# Patient Record
Sex: MALE | Race: OTHER | NOT HISPANIC OR LATINO
[De-identification: names, ages, dates, MRNs, and addresses within clinical notes are randomized per-mention and may not be internally consistent; named-entity substitution may affect disease eponyms.]

---

## 2018-07-02 ENCOUNTER — APPOINTMENT (OUTPATIENT)
Dept: PULMONOLOGY | Facility: CLINIC | Age: 73
End: 2018-07-02
Payer: MEDICARE

## 2018-07-02 VITALS
TEMPERATURE: 98 F | WEIGHT: 215 LBS | BODY MASS INDEX: 33.74 KG/M2 | OXYGEN SATURATION: 93 % | HEIGHT: 67 IN | DIASTOLIC BLOOD PRESSURE: 87 MMHG | RESPIRATION RATE: 12 BRPM | SYSTOLIC BLOOD PRESSURE: 150 MMHG | HEART RATE: 67 BPM

## 2018-07-02 PROCEDURE — 94727 GAS DIL/WSHOT DETER LNG VOL: CPT

## 2018-07-02 PROCEDURE — 99214 OFFICE O/P EST MOD 30 MIN: CPT | Mod: 25

## 2018-07-02 PROCEDURE — 94729 DIFFUSING CAPACITY: CPT

## 2018-10-15 ENCOUNTER — MEDICATION RENEWAL (OUTPATIENT)
Age: 73
End: 2018-10-15

## 2018-12-31 ENCOUNTER — OTHER (OUTPATIENT)
Age: 73
End: 2018-12-31

## 2019-01-16 ENCOUNTER — RX RENEWAL (OUTPATIENT)
Age: 74
End: 2019-01-16

## 2019-04-09 ENCOUNTER — OTHER (OUTPATIENT)
Age: 74
End: 2019-04-09

## 2019-04-17 ENCOUNTER — APPOINTMENT (OUTPATIENT)
Dept: PULMONOLOGY | Facility: CLINIC | Age: 74
End: 2019-04-17
Payer: MEDICARE

## 2019-04-17 VITALS — SYSTOLIC BLOOD PRESSURE: 158 MMHG | DIASTOLIC BLOOD PRESSURE: 90 MMHG | HEART RATE: 74 BPM | OXYGEN SATURATION: 95 %

## 2019-04-17 VITALS — DIASTOLIC BLOOD PRESSURE: 80 MMHG | SYSTOLIC BLOOD PRESSURE: 138 MMHG

## 2019-04-17 PROCEDURE — 94060 EVALUATION OF WHEEZING: CPT

## 2019-04-17 PROCEDURE — 99214 OFFICE O/P EST MOD 30 MIN: CPT | Mod: 25

## 2019-04-17 NOTE — REASON FOR VISIT
[Follow-Up] : a follow-up visit [COPD] : COPD [Cough] : cough [FreeTextEntry2] : cough sinse back from [place] for 10 days getting better. Had recent ct chest

## 2019-04-17 NOTE — ASSESSMENT
[FreeTextEntry1] : CT 1 year\par Increase advair BID\par Add steroid if pers. cough\par F/U 6 months if well\par COntinue CPAP

## 2019-04-17 NOTE — HISTORY OF PRESENT ILLNESS
[FreeTextEntry1] : was sick in [place] in January and [facility] and [facility] Biaxin prednisone 20 for 5 days , Singulair and duo neb and nebulizer which worked and patient has stopped  all of the above. Only doing Advair daily\par \par Upon r/t to [place] 10 days ago had scratchy throat and now cough with prn mucus. Did bring in disc for recent f/u ct chest to be reviewed. Feels like he is getting better but wife concerned over cough. Minimal wheeze

## 2019-04-17 NOTE — PHYSICAL EXAM
[Heart Sounds] : normal S1 and S2 [Normal Oropharynx] : normal oropharynx [Murmurs] : no murmurs present [Lungs Percussion] : the lungs were normal to percussion [Kyphosis] : kyphosis [Abdomen Soft] : soft [Abdomen Tenderness] : non-tender [] : no hepato-splenomegaly [Nail Clubbing] : no clubbing of the fingernails [Cyanosis, Localized] : no localized cyanosis [FreeTextEntry1] : rare wheeze

## 2019-04-17 NOTE — DISCUSSION/SUMMARY
[FreeTextEntry1] : Patient compliant to CPAP therapy and having positive clinical response to treatment. Continue present settings.\par will repeat sleep study next year for new cpap machine\par \par COPD/cough improving. \par \par CT Chest stable

## 2019-04-17 NOTE — PROCEDURE
[FreeTextEntry1] : cpap data downloaded and discussed with patient\par \par ct reviewed in office and discussed with patient \par \par Pulmonary function testing.\par These data demonstrate a mild-moderate obstructive ventilatory deficit. There is no significant bronchodilator response.

## 2019-09-14 ENCOUNTER — RX RENEWAL (OUTPATIENT)
Age: 74
End: 2019-09-14

## 2019-09-17 ENCOUNTER — RX RENEWAL (OUTPATIENT)
Age: 74
End: 2019-09-17

## 2019-09-17 RX ORDER — IPRATROPIUM BROMIDE AND ALBUTEROL SULFATE 2.5; .5 MG/3ML; MG/3ML
0.5-2.5 (3) SOLUTION RESPIRATORY (INHALATION) 4 TIMES DAILY
Qty: 360 | Refills: 5 | Status: ACTIVE | COMMUNITY
Start: 2019-09-17 | End: 1900-01-01

## 2019-10-16 ENCOUNTER — APPOINTMENT (OUTPATIENT)
Dept: PULMONOLOGY | Facility: CLINIC | Age: 74
End: 2019-10-16

## 2019-10-23 ENCOUNTER — APPOINTMENT (OUTPATIENT)
Dept: PULMONOLOGY | Facility: CLINIC | Age: 74
End: 2019-10-23
Payer: MEDICARE

## 2019-10-23 VITALS
RESPIRATION RATE: 16 BRPM | WEIGHT: 215 LBS | BODY MASS INDEX: 33.74 KG/M2 | HEART RATE: 83 BPM | OXYGEN SATURATION: 94 % | HEIGHT: 67 IN | DIASTOLIC BLOOD PRESSURE: 96 MMHG | SYSTOLIC BLOOD PRESSURE: 170 MMHG | TEMPERATURE: 98.5 F

## 2019-10-23 PROCEDURE — 71046 X-RAY EXAM CHEST 2 VIEWS: CPT

## 2019-10-23 PROCEDURE — 99214 OFFICE O/P EST MOD 30 MIN: CPT | Mod: 25

## 2019-10-24 NOTE — ASSESSMENT
[FreeTextEntry1] : Prescribed doxycycline and prednisone taper\par Repeat chest x-ray at for followup in 1 to 2 weeks\par Medications reviewed. Continue present medications.\par

## 2019-10-24 NOTE — PROCEDURE
[FreeTextEntry1] : Chest x-ray PA and lateral views performed in my office today showed mild left lower lobe infiltrate, no evidence of  pleural effusions.\par

## 2019-10-24 NOTE — PHYSICAL EXAM
[General Appearance - Well Developed] : well developed [Normal Appearance] : normal appearance [Well Groomed] : well groomed [General Appearance - Well Nourished] : well nourished [No Deformities] : no deformities [General Appearance - In No Acute Distress] : no acute distress [Normal Oropharynx] : normal oropharynx [Heart Rate And Rhythm] : heart rate and rhythm were normal [Heart Sounds] : normal S1 and S2 [Murmurs] : no murmurs present [FreeTextEntry1] : mild wheezes [Abdomen Soft] : soft [Abdomen Tenderness] : non-tender [Abdomen Mass (___ Cm)] : no abdominal mass palpated [Nail Clubbing] : no clubbing of the fingernails [Petechial Hemorrhages (___cm)] : no petechial hemorrhages [Cyanosis, Localized] : no localized cyanosis [] : no ischemic changes

## 2019-11-17 ENCOUNTER — FORM ENCOUNTER (OUTPATIENT)
Age: 74
End: 2019-11-17

## 2019-11-18 ENCOUNTER — APPOINTMENT (OUTPATIENT)
Dept: PULMONOLOGY | Facility: CLINIC | Age: 74
End: 2019-11-18
Payer: MEDICARE

## 2019-11-18 VITALS — SYSTOLIC BLOOD PRESSURE: 140 MMHG | DIASTOLIC BLOOD PRESSURE: 82 MMHG

## 2019-11-18 VITALS
TEMPERATURE: 97.6 F | HEART RATE: 73 BPM | RESPIRATION RATE: 16 BRPM | DIASTOLIC BLOOD PRESSURE: 89 MMHG | SYSTOLIC BLOOD PRESSURE: 156 MMHG | OXYGEN SATURATION: 93 %

## 2019-11-18 DIAGNOSIS — R05 COUGH: ICD-10-CM

## 2019-11-18 PROCEDURE — 99214 OFFICE O/P EST MOD 30 MIN: CPT | Mod: 25

## 2019-11-18 PROCEDURE — 94060 EVALUATION OF WHEEZING: CPT

## 2019-11-18 PROCEDURE — 95012 NITRIC OXIDE EXP GAS DETER: CPT

## 2019-11-18 PROCEDURE — 71046 X-RAY EXAM CHEST 2 VIEWS: CPT

## 2019-11-18 RX ORDER — PREDNISONE 10 MG/1
10 TABLET ORAL
Qty: 18 | Refills: 1 | Status: DISCONTINUED | COMMUNITY
Start: 2019-10-23 | End: 2019-11-18

## 2019-11-18 RX ORDER — DOXYCYCLINE HYCLATE 100 MG/1
100 TABLET ORAL
Qty: 14 | Refills: 0 | Status: DISCONTINUED | COMMUNITY
Start: 2019-10-23 | End: 2019-11-18

## 2019-11-18 NOTE — ASSESSMENT
[FreeTextEntry1] : Likely will need Increase in Advair dose. Increase to BID for now.\par Start Spiriva\par Pred 40 x 4d taper\par Ceftin \par F/U CT when well.

## 2019-11-18 NOTE — REASON FOR VISIT
[Follow-Up] : a follow-up visit [COPD] : COPD [Cough] : cough [FreeTextEntry2] : cough since back from [place] for 10 days getting better. Had recent ct chest

## 2019-11-18 NOTE — HISTORY OF PRESENT ILLNESS
[FreeTextEntry1] : All started 1 month ago. Improved to some degree then recc. \par Only doing Advair 100 daily. did take doxy and prednisone betsy with some help while he took it but soon after r/t cough and wheeze .Green mucus only in morning , large amount of mucus/ no major proair use\par \par Feels mucous may be dripping down. \par \par

## 2019-11-18 NOTE — PHYSICAL EXAM
[Normal Oropharynx] : normal oropharynx [Heart Sounds] : normal S1 and S2 [Murmurs] : no murmurs present [Lungs Percussion] : the lungs were normal to percussion [Kyphosis] : kyphosis [Abdomen Soft] : soft [Abdomen Tenderness] : non-tender [] : no hepato-splenomegaly [Nail Clubbing] : no clubbing of the fingernails [Cyanosis, Localized] : no localized cyanosis [FreeTextEntry1] : 1-2 plus wheeze bilat.

## 2019-11-18 NOTE — DISCUSSION/SUMMARY
[FreeTextEntry1] : Patient compliant to CPAP therapy and having positive clinical response to treatment. Continue present settings.\par Bring chip rto\par \par COPD exacerbation\par Abnormal CXR. \par CT Chest compared to cxr. Likely findings old but difficult to compare.

## 2019-12-03 ENCOUNTER — APPOINTMENT (OUTPATIENT)
Dept: PULMONOLOGY | Facility: CLINIC | Age: 74
End: 2019-12-03
Payer: MEDICARE

## 2019-12-03 VITALS
SYSTOLIC BLOOD PRESSURE: 151 MMHG | BODY MASS INDEX: 33.74 KG/M2 | HEIGHT: 67 IN | RESPIRATION RATE: 16 BRPM | WEIGHT: 215 LBS | HEART RATE: 80 BPM | OXYGEN SATURATION: 94 % | DIASTOLIC BLOOD PRESSURE: 76 MMHG

## 2019-12-03 PROCEDURE — 99213 OFFICE O/P EST LOW 20 MIN: CPT

## 2019-12-05 NOTE — PHYSICAL EXAM
[Normal Oropharynx] : normal oropharynx [Heart Sounds] : normal S1 and S2 [Lungs Percussion] : the lungs were normal to percussion [Murmurs] : no murmurs present [Kyphosis] : kyphosis [Abdomen Tenderness] : non-tender [Abdomen Soft] : soft [] : no hepato-splenomegaly [Nail Clubbing] : no clubbing of the fingernails [Cyanosis, Localized] : no localized cyanosis [FreeTextEntry1] : 1-2 plus wheeze bilat.

## 2019-12-05 NOTE — PHYSICAL EXAM
[Normal Oropharynx] : normal oropharynx [Heart Sounds] : normal S1 and S2 [Murmurs] : no murmurs present [Lungs Percussion] : the lungs were normal to percussion [Kyphosis] : kyphosis [Abdomen Tenderness] : non-tender [Abdomen Soft] : soft [Nail Clubbing] : no clubbing of the fingernails [] : no hepato-splenomegaly [Cyanosis, Localized] : no localized cyanosis [FreeTextEntry1] : 1-2 plus wheeze bilat.

## 2019-12-05 NOTE — DISCUSSION/SUMMARY
[FreeTextEntry1] : Patient compliant to CPAP therapy and having positive clinical response to treatment. Continue present settings.\par downloaded chip with good compliance\par \par COPD s/p exacerbation improved.\par Abnormal CXR. \par CT Chest compared to cxr. Likely findings old but difficult to compare.

## 2019-12-05 NOTE — PROCEDURE
[FreeTextEntry1] : \par 11/18/2019\par Pulmonary function testing\par These data demonstrate a moderate obstructive ventilatory deficit. There is no significant bronchodilator response.\par PFT attached relatively stable function.\par \par 11/18/2019 \par PA and lateral chest radiograph reveals A normal heart and mediastinum with a calcified aortic knob. There is no significant pleural disease. She is kyphotic. Bony structures are intact, no bilateral areas of atelectasis versus scarring predominantly in the lower lobes. There is a retrocardiac area of increased density also predominantly linear.\par \par There is no significant change compared to prior chest radiograph of 10/23/19\par \par Prior ct reviewed. Likely radiographic changes new but difficult to clearly charecterize.\par \par \par

## 2019-12-05 NOTE — HISTORY OF PRESENT ILLNESS
[FreeTextEntry1] : \par Done with antibiotics and last day of prednisone today . On advair bid and now spiriva and feeling much better.\par

## 2020-07-20 RX ORDER — ALBUTEROL SULFATE 90 UG/1
108 (90 BASE) INHALANT RESPIRATORY (INHALATION)
Qty: 1 | Refills: 5 | Status: ACTIVE | COMMUNITY
Start: 2018-07-02 | End: 1900-01-01

## 2020-07-22 DIAGNOSIS — Z20.828 CONTACT WITH AND (SUSPECTED) EXPOSURE TO OTHER VIRAL COMMUNICABLE DISEASES: ICD-10-CM

## 2020-07-28 DIAGNOSIS — Z01.818 ENCOUNTER FOR OTHER PREPROCEDURAL EXAMINATION: ICD-10-CM

## 2020-07-30 ENCOUNTER — APPOINTMENT (OUTPATIENT)
Dept: DISASTER EMERGENCY | Facility: CLINIC | Age: 75
End: 2020-07-30

## 2020-07-30 LAB — SARS-COV-2 N GENE NPH QL NAA+PROBE: NOT DETECTED

## 2020-08-04 ENCOUNTER — APPOINTMENT (OUTPATIENT)
Dept: PULMONOLOGY | Facility: CLINIC | Age: 75
End: 2020-08-04
Payer: MEDICARE

## 2020-08-04 VITALS
DIASTOLIC BLOOD PRESSURE: 78 MMHG | RESPIRATION RATE: 15 BRPM | HEART RATE: 78 BPM | SYSTOLIC BLOOD PRESSURE: 156 MMHG | TEMPERATURE: 98.2 F | OXYGEN SATURATION: 94 %

## 2020-08-04 DIAGNOSIS — R93.89 ABNORMAL FINDINGS ON DIAGNOSTIC IMAGING OF OTHER SPECIFIED BODY STRUCTURES: ICD-10-CM

## 2020-08-04 PROCEDURE — 94750: CPT

## 2020-08-04 PROCEDURE — 94729 DIFFUSING CAPACITY: CPT

## 2020-08-04 PROCEDURE — 94726 PLETHYSMOGRAPHY LUNG VOLUMES: CPT

## 2020-08-04 PROCEDURE — 94060 EVALUATION OF WHEEZING: CPT

## 2020-08-04 PROCEDURE — 71046 X-RAY EXAM CHEST 2 VIEWS: CPT

## 2020-08-04 PROCEDURE — 99214 OFFICE O/P EST MOD 30 MIN: CPT | Mod: 25

## 2020-08-04 RX ORDER — CEFUROXIME AXETIL 500 MG/1
500 TABLET ORAL
Qty: 10 | Refills: 0 | Status: DISCONTINUED | COMMUNITY
Start: 2019-11-18 | End: 2020-08-04

## 2020-08-04 NOTE — ASSESSMENT
[FreeTextEntry1] : get 2 night [facility] for replacement auto cpap\par Continue present bronchodilator therapy\par Follow-up CAT scan in December.\par Patient compliant to CPAP therapy and having positive clinical response to treatment. Continue present settings.\par \par

## 2020-08-04 NOTE — PHYSICAL EXAM
[Supple] : supple [No Neck Mass] : no neck mass [No JVD] : no jvd [Normal S1, S2] : normal s1, s2 [No Murmurs] : no murmurs [Clear to Auscultation Bilaterally] : clear to auscultation bilaterally [Normal to Percussion] : normal to percussion [Benign] : benign [No HSM] : no hsm [No Clubbing] : no clubbing [No Edema] : no edema [TextBox_2] : Overweight

## 2020-08-04 NOTE — HISTORY OF PRESENT ILLNESS
[TextBox_4] : Feeling well\par Lost a few pounds\par On CPAP doing well.\par No significant cough, wheezing, chest pain or SOB.\par No significant beta agonist use. \par cpap machine greater than 5 years old

## 2020-08-04 NOTE — PROCEDURE
[FreeTextEntry1] : 08/04/2020\par Pulmonary function testing\par These data demonstrate a mild obstructive ventilatory deficit. There is no significant bronchodilator response. There is elevation in the RV/TLC ratio indicative of possible air trapping. Diffusion capacity is normal. Airway resistance is normal. \par \par CPAP data was reviewed.  Good compliance and positive affect.

## 2020-08-04 NOTE — DISCUSSION/SUMMARY
[FreeTextEntry1] : Obstructive sleep apnea syndrome doing well on CPAP.\par Overweight on diet and weight loss.\par COPD stable.\par Abnormal CXR.  Area of change on the right has improved.\par

## 2020-08-10 ENCOUNTER — APPOINTMENT (OUTPATIENT)
Dept: PULMONOLOGY | Facility: CLINIC | Age: 75
End: 2020-08-10

## 2020-12-04 ENCOUNTER — APPOINTMENT (OUTPATIENT)
Dept: PULMONOLOGY | Facility: CLINIC | Age: 75
End: 2020-12-04
Payer: MEDICARE

## 2020-12-04 VITALS
BODY MASS INDEX: 34.21 KG/M2 | TEMPERATURE: 97.9 F | SYSTOLIC BLOOD PRESSURE: 144 MMHG | DIASTOLIC BLOOD PRESSURE: 66 MMHG | HEIGHT: 67 IN | HEART RATE: 67 BPM | WEIGHT: 218 LBS | OXYGEN SATURATION: 94 %

## 2020-12-04 DIAGNOSIS — Z87.891 PERSONAL HISTORY OF NICOTINE DEPENDENCE: ICD-10-CM

## 2020-12-04 PROCEDURE — 99214 OFFICE O/P EST MOD 30 MIN: CPT

## 2020-12-04 RX ORDER — PREDNISONE 10 MG/1
10 TABLET ORAL
Qty: 40 | Refills: 0 | Status: DISCONTINUED | COMMUNITY
Start: 2019-11-18 | End: 2020-12-04

## 2020-12-06 NOTE — PROCEDURE
[FreeTextEntry1] : 08/04/2020\par Pulmonary function testing\par These data demonstrate a mild obstructive ventilatory deficit. There is no significant bronchodilator response. There is elevation in the RV/TLC ratio indicative of possible air trapping. Diffusion capacity is normal. Airway resistance is normal. \par \par CPAP data was reviewed.  Good compliance and positive affect.\par \par ct reviewed and discussed

## 2020-12-06 NOTE — CONSULT LETTER
[Dear  ___] : Dear ~DAWNA, [Consult Letter:] : I had the pleasure of evaluating your patient, [unfilled]. [Please see my note below.] : Please see my note below. [Consult Closing:] : Thank you very much for allowing me to participate in the care of this patient.  If you have any questions, please do not hesitate to contact me. [Sincerely,] : Sincerely, [FreeTextEntry2] : Grant Elizabeth MD [FreeTextEntry3] : Tremayne Batista MD FCCP\par

## 2020-12-06 NOTE — DISCUSSION/SUMMARY
[FreeTextEntry1] : Obstructive sleep apnea syndrome doing well on CPAP.\par Overweight on diet and weight loss.\par COPD stable.\par Pulmonary nodules.\par Aortic aneurysm stable.\par

## 2020-12-06 NOTE — ASSESSMENT
[FreeTextEntry1] : \par Continue present bronchodilator therapy\par \par Patient compliant to CPAP therapy and having positive clinical response to treatment. Continue present settings.\par \par Cardiology follow-up.\par

## 2020-12-06 NOTE — HISTORY OF PRESENT ILLNESS
[TextBox_4] : Feeling well\par Lost a few pounds\par On CPAP doing well.\par No significant cough, wheezing, chest pain or SOB.\par No significant beta agonist use. \par cpap machine greater than 5 years old but does not want to get a new machine right now

## 2021-01-04 ENCOUNTER — APPOINTMENT (OUTPATIENT)
Dept: OTOLARYNGOLOGY | Facility: CLINIC | Age: 76
End: 2021-01-04
Payer: MEDICARE

## 2021-01-04 VITALS
WEIGHT: 215 LBS | DIASTOLIC BLOOD PRESSURE: 75 MMHG | HEIGHT: 67 IN | TEMPERATURE: 97.9 F | SYSTOLIC BLOOD PRESSURE: 129 MMHG | HEART RATE: 63 BPM | BODY MASS INDEX: 33.74 KG/M2

## 2021-01-04 DIAGNOSIS — H61.23 IMPACTED CERUMEN, BILATERAL: ICD-10-CM

## 2021-01-04 DIAGNOSIS — H90.3 SENSORINEURAL HEARING LOSS, BILATERAL: ICD-10-CM

## 2021-01-04 PROCEDURE — 99203 OFFICE O/P NEW LOW 30 MIN: CPT

## 2021-01-04 PROCEDURE — 92567 TYMPANOMETRY: CPT

## 2021-01-04 PROCEDURE — 92557 COMPREHENSIVE HEARING TEST: CPT

## 2021-01-04 RX ORDER — METOPROLOL SUCCINATE 50 MG/1
50 TABLET, EXTENDED RELEASE ORAL
Qty: 90 | Refills: 0 | Status: ACTIVE | COMMUNITY
Start: 2020-12-21

## 2021-01-04 RX ORDER — RAMIPRIL 5 MG/1
5 CAPSULE ORAL
Qty: 90 | Refills: 0 | Status: ACTIVE | COMMUNITY
Start: 2020-06-16

## 2021-01-04 RX ORDER — BETAMETHASONE DIPROPIONATE 0.5 MG/G
0.05 LOTION TOPICAL
Qty: 60 | Refills: 0 | Status: ACTIVE | COMMUNITY
Start: 2020-09-17

## 2021-01-04 NOTE — DATA REVIEWED
[de-identified] : bilat mild to mod SNHL\par Hearing Test performed to evaluate the extent of hearing loss and  to explain pt's symptoms\par

## 2021-01-04 NOTE — ASSESSMENT
[FreeTextEntry1] : Patient complains of hearing loss x several years, worked in construction for years\par Bilateral SNHL:\par -cleared for hearing aids\par -Hearing Test performed to evaluate the extent of hearing loss and to explain pt's symptoms\par

## 2021-01-04 NOTE — HISTORY OF PRESENT ILLNESS
[No] : patient does not have a  history of radiation therapy [Hearing Loss] : hearing loss [Presbycusis] : presbycusis [de-identified] : 74 yo male\par Patient complains of hearing loss x several years. States has has a hard time hearing when there is a lot of background noise. Otherwise he states he can hear well.  He worked in construction for many years. \par Pt has no ear pain, ear drainage, tinnitus, vertigo, nasal congestion, nasal discharge, epistaxis, sinus infections, facial pain, facial pressure, throat pain, dysphagia or fevers\par \par  [Tinnitus] : no tinnitus [Vertigo] : no vertigo [Anxiety] : no anxiety [Dizziness] : no dizziness [Headache] : no headache [Recurrent Otitis Media] : no recurrent otitis media [Otitis Media with Effusion] : no otitis media with effusion [Congenital Ear Malformation] : no congenital ear malformation [Meniere Disease] : no Meniere disease [Eustachian Tube Dysfunction] : no eustachian tube dysfunction [Otosclerosis] : no otosclerosis [Perilymphatic Fistula] : no perilymphatic fistula [Hypertension] : no hypertension [Loud Noise Exposure] : no history of loud noise exposure [Smoking] : no smoking [Early Onset Hearing Loss] : no early onset hearing loss [Stroke] : no stroke [Facial Pain] : no facial pain [Facial Pressure] : no facial pressure [Nasal Congestion] : no nasal congestion [Diplopia] : no diplopia [Ear Fullness] : no ear fullness [Allergic Rhinitis] : no allergic rhinitis [Maxillary Tooth Infection] : no maxillary tooth infection [Septal Deviation] : no septal deviation [Environmental Allergies] : no environmental allergies [Seasonal Allergies] : no seasonal allergies [Environmental Allergens] : no environmental allergens [Nasal FB Removal] : no nasal foreign body removal [Allergies] : no allergies [Asthma] : no asthma [Neck Mass] : no neck mass [Neck Pain] : no neck pain [Chills] : no chills [Cold Intolerance] : no cold intolerance [Cough] : no cough [Fatigue] : no fatigue [Heat Intolerance] : no heat intolerance [Hyperthyroidism] : no hyperthyroidism [Sialadenitis] : no sialadenitis [Hodgkin Disease] : no hodgkin disease [Non-Hodgkin Lymphoma] : no non-hodgkin lymphoma [Tobacco Use] : no tobacco use [Alcohol Use] : no alcohol use [Graves Disease] : no graves disease [Thyroid Cancer] : no thyroid cancer

## 2021-04-20 ENCOUNTER — APPOINTMENT (OUTPATIENT)
Dept: ORTHOPEDIC SURGERY | Facility: CLINIC | Age: 76
End: 2021-04-20
Payer: MEDICARE

## 2021-04-20 VITALS
HEIGHT: 67 IN | SYSTOLIC BLOOD PRESSURE: 149 MMHG | DIASTOLIC BLOOD PRESSURE: 85 MMHG | WEIGHT: 215 LBS | BODY MASS INDEX: 33.74 KG/M2 | HEART RATE: 76 BPM

## 2021-04-20 DIAGNOSIS — M17.11 UNILATERAL PRIMARY OSTEOARTHRITIS, RIGHT KNEE: ICD-10-CM

## 2021-04-20 PROCEDURE — 73564 X-RAY EXAM KNEE 4 OR MORE: CPT | Mod: RT

## 2021-04-20 PROCEDURE — 99203 OFFICE O/P NEW LOW 30 MIN: CPT | Mod: 25

## 2021-04-20 PROCEDURE — 20610 DRAIN/INJ JOINT/BURSA W/O US: CPT | Mod: RT

## 2021-04-21 PROBLEM — M17.11 PRIMARY OSTEOARTHRITIS OF RIGHT KNEE: Status: ACTIVE | Noted: 2021-04-21

## 2021-04-21 NOTE — HISTORY OF PRESENT ILLNESS
[de-identified] : 75 year old male presents today with right knee pain x 2 months. He started to have pain after he knelt for something. The pain is constant worse at night, stair use. Ibuprofen/Voltaren, Ice, is helpful. Reports occasional buckling. Denies catching, locking, numbness or tingling. He states that he did a lot of swimming during the winter when he was in [place]. Patient is s/p left knee PMM, chondroplasty medial femoral condyle 2012. \par \par The patient's past medical history, past surgical history, medications and allergies were reviewed by me today with the patient and documented accordingly. In addition, the patient's family and social history, which were noncontributory to this visit, were reviewed also.

## 2021-04-21 NOTE — PHYSICAL EXAM
[de-identified] : Oriented to time, place, person\par Mood: Normal\par Affect: Normal\par Appearance: Healthy, well appearing, no acute distress.\par Gait: Normal\par Assistive Devices: None\par \par Right Knee Exam:\par \par Skin: Clean, dry, intact\par Inspection: No obvious malalignment, no masses, no swelling, trace effusion\par Pulses: 2+ DP/PT pulses \par ROM: 0-120 degrees of flexion. + pain with deep knee flexion/extension.\par Tenderness: +MJLT. No LJLT. No pain over the patella facets. No pain to the quadriceps tendon. No pain to the patella tendon. No posterior knee tenderness.\par Stability: Stable to varus, valgus. Negative Lachman testing. Negative anterior drawer, negative posterior drawer.\par Strength: 5/5 Q/H/TA/GS/EHL, without atrophy\par Neuro: Intact to light touch throughout, DTRs normal\par Additional Tests: Negative Selina's test, Negative patellar grind test  [de-identified] : The following radiographs were ordered and read by me during this patients visit. I reviewed each radiograph in detail with the patient and discussed the findings as highlighted below. \par \par 4 views of the right knee were obtained today, 04/20/2021, that show no acute fracture or dislocation. There is moderate medial, no lateral and mild patellofemoral degenerative changes seen. Mild varus deformity. No significant other obvious osseous abnormality, otherwise unremarkable.

## 2021-04-21 NOTE — PROCEDURE
[de-identified] : Injection: Right knee joint.\par Indication: Osteoarthritis. \par \par A discussion was had with the patient regarding this procedure and all questions were answered. All risks, benefits and alternatives were discussed. These included but were not limited to bleeding, infection, and allergic reaction. Alcohol was used to clean the skin, and Betadine was used to sterilize and prep the area in the superolateral aspect of the right knee. Ethyl chloride spray was then used as a topical anesthetic. A 21-gauge needle was used to inject 4cc of 1% lidocaine and 1cc of 40mg/ml methylprednisolone into the knee. A sterile bandage was then applied. The patient tolerated the procedure well and there were no complications.

## 2021-04-21 NOTE — DISCUSSION/SUMMARY
[de-identified] : 76 y/o male with right knee OA. \par \par Patient presents for acute onset right knee pain.  X-rays show some moderate degree of arthrosis within the medial and patellofemoral joint.  I discussed the treatment of degenerative arthritis with the patient at length today, as well as the chronic degenerative process and likely future progression of the disease. Pain may be related to the bony degenerative changes seen on XR imaging, or the associated degeneration to the soft tissues within the knee joint, including cartilage, meniscus, or ligamentous structures.  I described the spectrum of treatment options available including nonoperative modalities to total joint arthroplasty. Noninvasive and nonoperative treatment modalities include weight reduction, activity modification with low impact exercise, PRN use of acetaminophen or anti-inflammatory medication, natural anti-inflammatory supplements, glucosamine/chondroitin, and physical therapy (for strengthening and conditioning). More invasive treatments can include injection therapies; including cortisone, hyaluronic acid (visco-supplementation), or platelet-rich-plasma (PRP) injections. Definitive treatment could also include future total joint arthroplasty if symptoms persist and cause prolonged disability or interference with activities of daily living. \par \par The risks and benefits of each treatment option was discussed and all questions were answered. At this time recommendations are for conservative and symptomatic care as detailed above with impact loading activity restriction, low impact exercise and avoidance of strenuous activity. \par \par Injection therapy was provided for therapeutic and symptomatic relief of his acute pain. \par \par Other recommendations include OTC NSAIDs or acetaminophen (if tolerated/able), with application of ice to the knee 2-3x daily for 20 minutes or after periods of activity. \par \par Follow up as needed.

## 2021-04-21 NOTE — ADDENDUM
[FreeTextEntry1] : This note was written by Milagros Vann on 04/20/2021 acting solely as a scribe for Dr. Alfonso Harrington.\par \par All medical record entries made by the Scribe were at my, Dr. Alfonso Harrington, direction and personally dictated by me on 04/20/2021. I have personally reviewed the chart and agree that the record accurately reflects my personal performance of the history, physical exam, assessment and plan.

## 2021-05-29 ENCOUNTER — RX RENEWAL (OUTPATIENT)
Age: 76
End: 2021-05-29

## 2021-09-28 ENCOUNTER — APPOINTMENT (OUTPATIENT)
Dept: PULMONOLOGY | Facility: CLINIC | Age: 76
End: 2021-09-28
Payer: MEDICARE

## 2021-09-28 VITALS
HEART RATE: 69 BPM | SYSTOLIC BLOOD PRESSURE: 111 MMHG | TEMPERATURE: 97.8 F | OXYGEN SATURATION: 94 % | DIASTOLIC BLOOD PRESSURE: 63 MMHG

## 2021-09-28 PROCEDURE — 71046 X-RAY EXAM CHEST 2 VIEWS: CPT

## 2021-09-28 PROCEDURE — 99213 OFFICE O/P EST LOW 20 MIN: CPT | Mod: CS,25

## 2021-09-30 NOTE — DISCUSSION/SUMMARY
[FreeTextEntry1] : Obstructive sleep apnea syndrome doing well on CPAP..\par Patient compliant to CPAP therapy and having positive clinical response to treatment. Continue present settings.\par Overweight on diet and weight loss.\par COPD stable.\par Pulmonary nodules.\par Aortic aneurysm stable.\par s/p COVID infection clinically improved.\par

## 2021-09-30 NOTE — REASON FOR VISIT
[Follow-Up] : a follow-up visit [TextBox_44] : s/p Covid in July despite vaccination with moderna was in [place]

## 2021-09-30 NOTE — HISTORY OF PRESENT ILLNESS
[TextBox_4] : Had COVID in July, \par no hospitalization\par  had moderna vaccine\par \par lost smell and taste\par fatigue\par had mild cough but none now, nasal drip\par no major changes in sob\par \par on Advair\par  proair rare

## 2021-09-30 NOTE — ASSESSMENT
[FreeTextEntry1] : \par Continue present bronchodilator therapy\par \par Patient compliant to CPAP therapy and having positive clinical response to treatment. Continue present settings.\par Follow-up in 3 months and recheck lung function.\par \par

## 2021-09-30 NOTE — PROCEDURE
[FreeTextEntry1] : cpap data downloaded and discussed with patient\par \par \par \par 08/04/2020\par Pulmonary function testing\par These data demonstrate a mild obstructive ventilatory deficit. There is no significant bronchodilator response. There is elevation in the RV/TLC ratio indicative of possible air trapping. Diffusion capacity is normal. Airway resistance is normal. \par \par CPAP data was reviewed.  Good compliance and positive affect.\par \par ct reviewed and discussed

## 2021-11-23 DIAGNOSIS — Z01.812 ENCOUNTER FOR PREPROCEDURAL LABORATORY EXAMINATION: ICD-10-CM

## 2021-11-30 ENCOUNTER — APPOINTMENT (OUTPATIENT)
Dept: PULMONOLOGY | Facility: CLINIC | Age: 76
End: 2021-11-30
Payer: MEDICARE

## 2021-11-30 ENCOUNTER — RX RENEWAL (OUTPATIENT)
Age: 76
End: 2021-11-30

## 2021-11-30 VITALS
HEART RATE: 70 BPM | RESPIRATION RATE: 16 BRPM | OXYGEN SATURATION: 94 % | BODY MASS INDEX: 33.74 KG/M2 | HEIGHT: 67 IN | DIASTOLIC BLOOD PRESSURE: 77 MMHG | WEIGHT: 215 LBS | SYSTOLIC BLOOD PRESSURE: 121 MMHG | TEMPERATURE: 98.3 F

## 2021-11-30 DIAGNOSIS — U07.1 COVID-19: ICD-10-CM

## 2021-11-30 PROCEDURE — 94060 EVALUATION OF WHEEZING: CPT

## 2021-11-30 PROCEDURE — ZZZZZ: CPT

## 2021-11-30 PROCEDURE — 99213 OFFICE O/P EST LOW 20 MIN: CPT | Mod: CS,25

## 2021-11-30 PROCEDURE — 94729 DIFFUSING CAPACITY: CPT

## 2021-11-30 PROCEDURE — 94727 GAS DIL/WSHOT DETER LNG VOL: CPT

## 2021-11-30 PROCEDURE — 88738 HGB QUANT TRANSCUTANEOUS: CPT

## 2021-12-01 NOTE — DISCUSSION/SUMMARY
[FreeTextEntry1] : Obstructive sleep apnea syndrome doing well on CPAP..\par Patient compliant to CPAP therapy and having positive clinical response to treatment. Continue present settings.\par Overweight on diet and weight loss.\par COPD stable.\par Pulmonary nodules.\par Aortic aneurysm stable.\par s/p COVID infection clinically improved.  Lung function stable.\par

## 2021-12-01 NOTE — ASSESSMENT
[FreeTextEntry1] : For follow-up CT of the chest.\par Continue present bronchodilator therapy\par \par Patient compliant to CPAP therapy and having positive clinical response to treatment. Continue present settings.\par \par \par

## 2021-12-01 NOTE — HISTORY OF PRESENT ILLNESS
[TextBox_4] : Had COVID in July, \par no hospitalization\par  had moderna vaccine\par \par Feeling baseline.\par Still some fatigue.\par \par on Advair\par ProAir rare\par \par using CPAP nightly\par \par For CT Thursday

## 2021-12-01 NOTE — PROCEDURE
[FreeTextEntry1] : cpap data downloaded and discussed with patient. Good\par \par 11/30/2021\par Pulmonary function testing\par There is a mild-mod ventilatory impairment in a combined obstructive and restrictive pattern There is elevation in the RV/TLC ratio indicative of possible air trapping. Normal Lung Volumes. Diffusion capacity is normal. \par There is no significant change in function compared to August 2020.\par \par \par

## 2021-12-01 NOTE — REASON FOR VISIT
[Follow-Up] : a follow-up visit [Sleep Apnea] : sleep apnea [TextBox_44] : s/p Covid in July despite vaccination with moderna was in [place]

## 2021-12-03 ENCOUNTER — APPOINTMENT (OUTPATIENT)
Dept: PULMONOLOGY | Facility: CLINIC | Age: 76
End: 2021-12-03

## 2021-12-15 ENCOUNTER — TRANSCRIPTION ENCOUNTER (OUTPATIENT)
Age: 76
End: 2021-12-15

## 2021-12-21 RX ORDER — FLUTICASONE PROPIONATE AND SALMETEROL 50; 100 UG/1; UG/1
100-50 POWDER RESPIRATORY (INHALATION)
Qty: 180 | Refills: 0 | Status: ACTIVE | COMMUNITY
Start: 2020-07-15 | End: 1900-01-01

## 2022-04-22 ENCOUNTER — APPOINTMENT (OUTPATIENT)
Dept: PULMONOLOGY | Facility: CLINIC | Age: 77
End: 2022-04-22
Payer: MEDICARE

## 2022-04-22 VITALS
OXYGEN SATURATION: 96 % | DIASTOLIC BLOOD PRESSURE: 87 MMHG | SYSTOLIC BLOOD PRESSURE: 143 MMHG | HEART RATE: 70 BPM | TEMPERATURE: 98.5 F

## 2022-04-22 PROCEDURE — 99214 OFFICE O/P EST MOD 30 MIN: CPT | Mod: 25

## 2022-04-22 PROCEDURE — 94010 BREATHING CAPACITY TEST: CPT

## 2022-04-22 RX ORDER — FLUTICASONE PROPIONATE AND SALMETEROL 100; 50 UG/1; UG/1
100-50 POWDER RESPIRATORY (INHALATION) TWICE DAILY
Qty: 180 | Refills: 2 | Status: COMPLETED | COMMUNITY
Start: 2018-10-15 | End: 2022-04-22

## 2022-04-22 RX ORDER — FLUTICASONE PROPIONATE AND SALMETEROL 100; 50 UG/1; UG/1
100-50 POWDER RESPIRATORY (INHALATION) TWICE DAILY
Qty: 3 | Refills: 3 | Status: COMPLETED | COMMUNITY
Start: 2020-07-08 | End: 2022-04-22

## 2022-04-25 NOTE — PROCEDURE
[FreeTextEntry1] : cpap data downloaded and discussed with patient. Good\par \par \par Pulmonary function testing of April 22, 2022.\par There is a moderate ventilatory impairment in a combined obstructive and restrictive pattern. \par Relatively stable flow rates.\par \par CAT scan of the chest of December 2, 2021 reviewed and discussed with patient.

## 2022-04-25 NOTE — HISTORY OF PRESENT ILLNESS
[TextBox_4] : Had COVID in July, \par \par \par on Advair\par ProAir rare\par using CPAP nightly; Resmed machine, full face mask \par Positive EGAN. No cough or wheeze. Bending over winded or occ. dizzy. No change. \par Last CT in Dec 2021\par

## 2022-04-25 NOTE — ASSESSMENT
[FreeTextEntry1] : Repeat CT 1 year from prior.\par Continue present bronchodilator therapy\par \par Patient compliant to CPAP therapy and having positive clinical response to treatment. Continue present settings\par \par Program of exercise and weight loss would be helpful.\par \par 35 minutes spent in evaluation and management.\par \par \par

## 2022-04-25 NOTE — DISCUSSION/SUMMARY
[FreeTextEntry1] : Obstructive sleep apnea syndrome doing well on CPAP..\par Patient compliant to CPAP therapy and having positive clinical response to treatment. Continue present settings.\par Overweight on diet and weight loss.\par COPD clinically and functionally stable.\par Pulmonary nodules.\par Aortic aneurysm stable.\par s/p COVID infection clinically improved.  Lung function stable.\par Ascending thoracic aortic aneurysm.  Stable

## 2022-06-20 ENCOUNTER — RX RENEWAL (OUTPATIENT)
Age: 77
End: 2022-06-20

## 2022-10-24 ENCOUNTER — RX RENEWAL (OUTPATIENT)
Age: 77
End: 2022-10-24

## 2022-10-24 RX ORDER — FLUTICASONE PROPIONATE AND SALMETEROL 50; 100 UG/1; UG/1
100-50 POWDER RESPIRATORY (INHALATION)
Qty: 180 | Refills: 0 | Status: ACTIVE | COMMUNITY
Start: 2022-06-20 | End: 1900-01-01

## 2022-10-28 ENCOUNTER — APPOINTMENT (OUTPATIENT)
Dept: PULMONOLOGY | Facility: CLINIC | Age: 77
End: 2022-10-28

## 2022-12-20 ENCOUNTER — APPOINTMENT (OUTPATIENT)
Dept: PULMONOLOGY | Facility: CLINIC | Age: 77
End: 2022-12-20

## 2022-12-20 VITALS
WEIGHT: 212 LBS | BODY MASS INDEX: 33.27 KG/M2 | OXYGEN SATURATION: 96 % | DIASTOLIC BLOOD PRESSURE: 84 MMHG | HEART RATE: 56 BPM | HEIGHT: 67 IN | RESPIRATION RATE: 16 BRPM | SYSTOLIC BLOOD PRESSURE: 119 MMHG

## 2022-12-20 DIAGNOSIS — R91.1 SOLITARY PULMONARY NODULE: ICD-10-CM

## 2022-12-20 LAB — POCT - HEMOGLOBIN (HGB), QUANTITATIVE, TRANSCUTANEOUS: 14.4

## 2022-12-20 PROCEDURE — 94727 GAS DIL/WSHOT DETER LNG VOL: CPT

## 2022-12-20 PROCEDURE — 94060 EVALUATION OF WHEEZING: CPT

## 2022-12-20 PROCEDURE — 99214 OFFICE O/P EST MOD 30 MIN: CPT | Mod: 25

## 2022-12-20 PROCEDURE — 94729 DIFFUSING CAPACITY: CPT

## 2022-12-20 PROCEDURE — 88738 HGB QUANT TRANSCUTANEOUS: CPT

## 2022-12-23 NOTE — DISCUSSION/SUMMARY
[FreeTextEntry1] : Obstructive sleep apnea syndrome doing well on CPAP..\par Patient compliant to CPAP therapy and having positive clinical response to treatment. Continue present settings.\par Overweight on diet and weight loss.\par COPD clinically stable.  Mild decrease in flow rates.\par Pulmonary nodules.\par Aortic aneurysm stable.\par s/p COVID infection clinically improved.  Lung function stable.\par Ascending thoracic aortic aneurysm.  Stable

## 2022-12-23 NOTE — PROCEDURE
[FreeTextEntry1] : unable to download cpap data\par \par Pulmonary function testing on December 20, 2022.\par There is a moderate ventilatory impairment in a combined obstructive and restrictive pattern. There is no significant bronchodilator response. There is elevation in the RV/TLC ratio indicative of possible air trapping. Diffusion capacity is normal.\par Compared to November 2021 there is a mild decrease in flow rates and minimal decrease in diffusion capacity.\par \par \par CAT scan of the chest of December 2, 2021 reviewed and discussed with patient.

## 2022-12-23 NOTE — ASSESSMENT
[FreeTextEntry1] : Repeat CT \par Continue present bronchodilator therapy\par Patient compliant to CPAP therapy and having positive clinical response to treatment. Continue present settings\par Next visit will bring in machine.\par Program of exercise and weight loss commended and discussed.\par On r/t from [place] will repeat 2 night HHS for replacement machine\par 35 minutes spent in evaluation and management.\par \par \par

## 2022-12-23 NOTE — HISTORY OF PRESENT ILLNESS
[TextBox_4] : Had COVID in July, \par on Advair bid\par feels breathing is stable\par some nasal drip\par no mucus in chest\par  going to [place], more active there\par ProAir rare use.\par using CPAP nightly; Resmed machine, full face mask \par Positive EGAN. No cough or wheeze. \par Last CT in Dec 2021\par

## 2023-01-03 ENCOUNTER — NON-APPOINTMENT (OUTPATIENT)
Age: 78
End: 2023-01-03

## 2023-01-24 RX ORDER — BUDESONIDE AND FORMOTEROL FUMARATE DIHYDRATE 160; 4.5 UG/1; UG/1
160-4.5 AEROSOL RESPIRATORY (INHALATION) TWICE DAILY
Qty: 1 | Refills: 3 | Status: ACTIVE | COMMUNITY
Start: 2023-01-23 | End: 1900-01-01

## 2023-01-30 ENCOUNTER — APPOINTMENT (OUTPATIENT)
Dept: PULMONOLOGY | Facility: CLINIC | Age: 78
End: 2023-01-30

## 2023-05-02 ENCOUNTER — APPOINTMENT (OUTPATIENT)
Dept: PULMONOLOGY | Facility: CLINIC | Age: 78
End: 2023-05-02
Payer: MEDICARE

## 2023-05-02 VITALS — DIASTOLIC BLOOD PRESSURE: 78 MMHG | OXYGEN SATURATION: 93 % | HEART RATE: 58 BPM | SYSTOLIC BLOOD PRESSURE: 134 MMHG

## 2023-05-02 DIAGNOSIS — I71.9 AORTIC ANEURYSM OF UNSPECIFIED SITE, W/OUT RUPTURE: ICD-10-CM

## 2023-05-02 LAB — POCT - HEMOGLOBIN (HGB), QUANTITATIVE, TRANSCUTANEOUS: 14.9

## 2023-05-02 PROCEDURE — 94727 GAS DIL/WSHOT DETER LNG VOL: CPT

## 2023-05-02 PROCEDURE — 94729 DIFFUSING CAPACITY: CPT

## 2023-05-02 PROCEDURE — 99214 OFFICE O/P EST MOD 30 MIN: CPT | Mod: 25

## 2023-05-02 PROCEDURE — ZZZZZ: CPT

## 2023-05-02 PROCEDURE — 88738 HGB QUANT TRANSCUTANEOUS: CPT

## 2023-05-02 PROCEDURE — 95800 SLP STDY UNATTENDED: CPT | Mod: 52

## 2023-05-03 PROBLEM — I71.9 AORTIC ANEURYSM: Status: ACTIVE | Noted: 2020-12-04

## 2023-05-03 PROCEDURE — 95800 SLP STDY UNATTENDED: CPT

## 2023-05-15 ENCOUNTER — APPOINTMENT (OUTPATIENT)
Dept: PULMONOLOGY | Facility: CLINIC | Age: 78
End: 2023-05-15

## 2023-05-19 ENCOUNTER — APPOINTMENT (OUTPATIENT)
Dept: PULMONOLOGY | Facility: CLINIC | Age: 78
End: 2023-05-19
Payer: MEDICARE

## 2023-05-19 PROCEDURE — 99213 OFFICE O/P EST LOW 20 MIN: CPT | Mod: 95

## 2023-05-19 NOTE — HISTORY OF PRESENT ILLNESS
[TextBox_4] : now on breztri for past few months and likes it, no issues with breathing.  Baseline dyspnea on exertion.\par did have bronchitis in March in [place] , got antibiotics and steroids with resolution of symptoms.\par feels breathing is stable\par now no cough or wheeze\par  going to [place], more active there\par ProAir rare use.\par using CPAP nightly; Resmed machine, full face mask \par Positive EGAN. \par Last CT in Dec 2022\par Had COVID in July, 2022\par seeing cardio on statin\par \par using med stair for cpap uses full face mask large

## 2023-05-19 NOTE — DISCUSSION/SUMMARY
[FreeTextEntry1] : Obstructive sleep apnea syndrome doing well on CPAP..\par Patient compliant to CPAP therapy and having positive clinical response to treatment. Continue present settings.\par Overweight on diet and weight loss.\par COPD clinically stable.  Mild decrease in flow rates.  Will observe.  Feeling well.  On bronchodilator therapy.\par Pulmonary nodules.\par Aortic aneurysm stable.\par s/p COVID infection clinically improved.  Lung function stable.\par Ascending thoracic aortic aneurysm.  Stable

## 2023-05-19 NOTE — ASSESSMENT
[FreeTextEntry1] : Repeat CAT scan in 1 year from prior.  Task sent as reminder.\par Continue present bronchodilator therapy\par Patient compliant to CPAP therapy and having positive clinical response to treatment. Continue present settings\par Continue Breztri.\par Program of exercise and weight loss commended and discussed.\par 2 night HHS ordered for replacement machine\par \par 35 minutes spent in evaluation and management.\par \par \par

## 2023-05-19 NOTE — PROCEDURE
[FreeTextEntry1] : unable to download cpap data\par \par 05/02/2023\par Pulmonary function testing\par There is a moderate ventilatory impairment in a combined obstructive and restrictive pattern. There is elevation in the RV/TLC ratio indicative of possible air trapping. There is a mild diffusion impairment. Corrects to normal with lung volume correction \par There is a mild decrease in function compared to December 2022.\par \par Ct 12/2022 reviewed and discussed with patient.\par Multiple stable pulmonary nodules\par Stable borderline mediastinal adenopathy.  Largest 12 mm.\par Stable mild fusiform dilatation of the ascending aorta 4.2 cm.\par Emphysematous changes.  Mild.\par Evidence of airways inflammation.\par \par

## 2023-05-19 NOTE — REASON FOR VISIT
[Home] : at home, [unfilled] , at the time of the visit. [Medical Office: ([facility])___] : at the medical office located in  [Follow-Up] : a follow-up visit [Sleep Apnea] : sleep apnea [This encounter was initiated by telehealth (audio with video) and converted to telephone (audio only) due to technical difficulties.] : This encounter was initiated by telehealth (audio with video) and converted to telephone (audio only) due to technical difficulties.

## 2023-05-22 NOTE — PROCEDURE
[FreeTextEntry1] : discussed 2 night HHs\par \par 05/02/2023\par Pulmonary function testing\par There is a moderate ventilatory impairment in a combined obstructive and restrictive pattern. There is elevation in the RV/TLC ratio indicative of possible air trapping. There is a mild diffusion impairment. Corrects to normal with lung volume correction \par There is a mild decrease in function compared to December 2022.\par \par Ct 12/2022 reviewed and discussed with patient.\par Multiple stable pulmonary nodules\par Stable borderline mediastinal adenopathy.  Largest 12 mm.\par Stable mild fusiform dilatation of the ascending aorta 4.2 cm.\par Emphysematous changes.  Mild.\par Evidence of airways inflammation.\par \par

## 2023-05-22 NOTE — DISCUSSION/SUMMARY
[FreeTextEntry1] : Moderate Obstructive sleep apnea syndrome, AHI 15 RDI 23 13 .9 percent desaturations, doing well on CPAP..\par Patient compliant to CPAP therapy and having positive clinical response to treatment. Continue present settings.\par Overweight on diet and weight loss.\par COPD clinically stable.  Mild decrease in flow rates.  Will observe.  Feeling well.  On bronchodilator therapy.\par Pulmonary nodules.\par Aortic aneurysm stable.\par s/p COVID infection clinically improved.  Lung function stable.\par Ascending thoracic aortic aneurysm.  Stable

## 2023-05-22 NOTE — HISTORY OF PRESENT ILLNESS
[TextBox_4] : This visit was provided via telehealth using real-time 2-way audio visual technology. The patient, SUHAS OLGUIN , was located at home, [address]\[place] [address]  at the time of the visit.\par The provider, Tremayne Batista, was located at office [address] [place] [address] at the time of the visit. \par \par  The patient, Mr. SUHAS OLGUIN  and Physician Tremayne Betts participated in the telehealth encounter.\par \par Verbal consent obtained by  from patient\par \par \par Had recent 2 night HHS\par using greater than 5 year cpap machine\par uses nightly

## 2023-05-22 NOTE — ASSESSMENT
[FreeTextEntry1] : Repeat CAT scan in 1 year from prior.  Task sent as reminder.\par Continue present bronchodilator therapy\par Patient compliant to CPAP therapy and having positive clinical response to treatment. Continue present settings\par Continue Breztri.\par Program of exercise and weight loss commended and discussed.\par order Resmed auto cpap 6 -16 cm air fit F 20 large face mask via [facility]/ [facility]\par r/t for compliance after reviewing machine, will r/t in August 2023\par \par 35 minutes spent in evaluation and management.\par \par \par

## 2023-08-11 ENCOUNTER — APPOINTMENT (OUTPATIENT)
Dept: NUCLEAR MEDICINE | Facility: IMAGING CENTER | Age: 78
End: 2023-08-11
Payer: MEDICARE

## 2023-08-11 ENCOUNTER — OUTPATIENT (OUTPATIENT)
Dept: OUTPATIENT SERVICES | Facility: HOSPITAL | Age: 78
LOS: 1 days | End: 2023-08-11
Payer: MEDICARE

## 2023-08-11 DIAGNOSIS — G20 PARKINSON'S DISEASE: ICD-10-CM

## 2023-08-11 PROCEDURE — 78803 RP LOCLZJ TUM SPECT 1 AREA: CPT | Mod: 26,MH

## 2023-08-11 PROCEDURE — A9584: CPT

## 2023-08-11 PROCEDURE — 78803 RP LOCLZJ TUM SPECT 1 AREA: CPT | Mod: MH

## 2023-09-08 ENCOUNTER — RX RENEWAL (OUTPATIENT)
Age: 78
End: 2023-09-08

## 2023-11-06 ENCOUNTER — APPOINTMENT (OUTPATIENT)
Dept: PULMONOLOGY | Facility: CLINIC | Age: 78
End: 2023-11-06
Payer: MEDICARE

## 2023-11-06 VITALS — DIASTOLIC BLOOD PRESSURE: 68 MMHG | RESPIRATION RATE: 93 BRPM | HEART RATE: 74 BPM | SYSTOLIC BLOOD PRESSURE: 122 MMHG

## 2023-11-06 PROCEDURE — ZZZZZ: CPT

## 2023-11-06 PROCEDURE — 94729 DIFFUSING CAPACITY: CPT

## 2023-11-06 PROCEDURE — 99214 OFFICE O/P EST MOD 30 MIN: CPT | Mod: 25

## 2023-11-06 PROCEDURE — 94727 GAS DIL/WSHOT DETER LNG VOL: CPT

## 2023-11-06 PROCEDURE — 94010 BREATHING CAPACITY TEST: CPT

## 2023-12-12 ENCOUNTER — NON-APPOINTMENT (OUTPATIENT)
Age: 78
End: 2023-12-12

## 2023-12-14 ENCOUNTER — NON-APPOINTMENT (OUTPATIENT)
Age: 78
End: 2023-12-14

## 2024-03-22 RX ORDER — BUDESONIDE, GLYCOPYRROLATE, AND FORMOTEROL FUMARATE 160; 9; 4.8 UG/1; UG/1; UG/1
160-9-4.8 AEROSOL, METERED RESPIRATORY (INHALATION)
Qty: 1 | Refills: 2 | Status: ACTIVE | COMMUNITY
Start: 2023-01-24 | End: 1900-01-01

## 2024-06-28 ENCOUNTER — APPOINTMENT (OUTPATIENT)
Dept: PULMONOLOGY | Facility: CLINIC | Age: 79
End: 2024-06-28
Payer: MEDICARE

## 2024-06-28 VITALS — HEART RATE: 65 BPM | DIASTOLIC BLOOD PRESSURE: 65 MMHG | SYSTOLIC BLOOD PRESSURE: 112 MMHG | OXYGEN SATURATION: 93 %

## 2024-06-28 DIAGNOSIS — J44.9 CHRONIC OBSTRUCTIVE PULMONARY DISEASE, UNSPECIFIED: ICD-10-CM

## 2024-06-28 DIAGNOSIS — G47.33 OBSTRUCTIVE SLEEP APNEA (ADULT) (PEDIATRIC): ICD-10-CM

## 2024-06-28 DIAGNOSIS — R93.89 ABNORMAL FINDINGS ON DIAGNOSTIC IMAGING OF OTHER SPECIFIED BODY STRUCTURES: ICD-10-CM

## 2024-06-28 PROCEDURE — 94010 BREATHING CAPACITY TEST: CPT

## 2024-06-28 PROCEDURE — 99214 OFFICE O/P EST MOD 30 MIN: CPT | Mod: 25

## 2024-12-03 ENCOUNTER — APPOINTMENT (OUTPATIENT)
Dept: PULMONOLOGY | Facility: CLINIC | Age: 79
End: 2024-12-03

## 2024-12-17 ENCOUNTER — APPOINTMENT (OUTPATIENT)
Dept: PULMONOLOGY | Facility: CLINIC | Age: 79
End: 2024-12-17

## 2025-04-04 RX ORDER — AZITHROMYCIN 250 MG/1
250 TABLET, FILM COATED ORAL
Qty: 1 | Refills: 0 | Status: ACTIVE | COMMUNITY
Start: 2025-04-04 | End: 1900-01-01

## 2025-04-04 RX ORDER — PREDNISONE 10 MG/1
10 TABLET ORAL
Qty: 30 | Refills: 0 | Status: ACTIVE | COMMUNITY
Start: 2025-04-04 | End: 1900-01-01

## 2025-06-07 ENCOUNTER — INPATIENT (INPATIENT)
Facility: HOSPITAL | Age: 80
LOS: 2 days | Discharge: HOME | DRG: 125 | End: 2025-06-10
Attending: INTERNAL MEDICINE | Admitting: INTERNAL MEDICINE
Payer: MEDICARE

## 2025-06-07 VITALS
SYSTOLIC BLOOD PRESSURE: 135 MMHG | DIASTOLIC BLOOD PRESSURE: 79 MMHG | HEIGHT: 67 IN | RESPIRATION RATE: 20 BRPM | TEMPERATURE: 98 F | HEART RATE: 56 BPM | WEIGHT: 169.98 LBS | OXYGEN SATURATION: 96 %

## 2025-06-07 DIAGNOSIS — H53.9 UNSPECIFIED VISUAL DISTURBANCE: ICD-10-CM

## 2025-06-07 LAB
ALBUMIN SERPL ELPH-MCNC: 4.4 G/DL — SIGNIFICANT CHANGE UP (ref 3.3–5)
ALP SERPL-CCNC: 59 U/L — SIGNIFICANT CHANGE UP (ref 40–120)
ALT FLD-CCNC: 16 U/L — SIGNIFICANT CHANGE UP (ref 10–45)
ANION GAP SERPL CALC-SCNC: 15 MMOL/L — SIGNIFICANT CHANGE UP (ref 5–17)
APTT BLD: 30.4 SEC — SIGNIFICANT CHANGE UP (ref 26.1–36.8)
AST SERPL-CCNC: 21 U/L — SIGNIFICANT CHANGE UP (ref 10–40)
BASOPHILS # BLD AUTO: 0.04 K/UL — SIGNIFICANT CHANGE UP (ref 0–0.2)
BASOPHILS NFR BLD AUTO: 0.5 % — SIGNIFICANT CHANGE UP (ref 0–2)
BILIRUB SERPL-MCNC: 0.6 MG/DL — SIGNIFICANT CHANGE UP (ref 0.2–1.2)
BUN SERPL-MCNC: 22 MG/DL — SIGNIFICANT CHANGE UP (ref 7–23)
CALCIUM SERPL-MCNC: 9.9 MG/DL — SIGNIFICANT CHANGE UP (ref 8.4–10.5)
CHLORIDE SERPL-SCNC: 102 MMOL/L — SIGNIFICANT CHANGE UP (ref 96–108)
CO2 SERPL-SCNC: 23 MMOL/L — SIGNIFICANT CHANGE UP (ref 22–31)
CREAT SERPL-MCNC: 0.97 MG/DL — SIGNIFICANT CHANGE UP (ref 0.5–1.3)
EGFR: 80 ML/MIN/1.73M2 — SIGNIFICANT CHANGE UP
EGFR: 80 ML/MIN/1.73M2 — SIGNIFICANT CHANGE UP
EOSINOPHIL # BLD AUTO: 0.3 K/UL — SIGNIFICANT CHANGE UP (ref 0–0.5)
EOSINOPHIL NFR BLD AUTO: 3.9 % — SIGNIFICANT CHANGE UP (ref 0–6)
GLUCOSE SERPL-MCNC: 95 MG/DL — SIGNIFICANT CHANGE UP (ref 70–99)
HCT VFR BLD CALC: 42.5 % — SIGNIFICANT CHANGE UP (ref 39–50)
HGB BLD-MCNC: 13.8 G/DL — SIGNIFICANT CHANGE UP (ref 13–17)
IMM GRANULOCYTES NFR BLD AUTO: 0.4 % — SIGNIFICANT CHANGE UP (ref 0–0.9)
INR BLD: 1.05 RATIO — SIGNIFICANT CHANGE UP (ref 0.85–1.16)
LYMPHOCYTES # BLD AUTO: 0.83 K/UL — LOW (ref 1–3.3)
LYMPHOCYTES # BLD AUTO: 10.8 % — LOW (ref 13–44)
MCHC RBC-ENTMCNC: 30.7 PG — SIGNIFICANT CHANGE UP (ref 27–34)
MCHC RBC-ENTMCNC: 32.5 G/DL — SIGNIFICANT CHANGE UP (ref 32–36)
MCV RBC AUTO: 94.4 FL — SIGNIFICANT CHANGE UP (ref 80–100)
MONOCYTES # BLD AUTO: 0.7 K/UL — SIGNIFICANT CHANGE UP (ref 0–0.9)
MONOCYTES NFR BLD AUTO: 9.1 % — SIGNIFICANT CHANGE UP (ref 2–14)
NEUTROPHILS # BLD AUTO: 5.82 K/UL — SIGNIFICANT CHANGE UP (ref 1.8–7.4)
NEUTROPHILS NFR BLD AUTO: 75.3 % — SIGNIFICANT CHANGE UP (ref 43–77)
NRBC BLD AUTO-RTO: 0 /100 WBCS — SIGNIFICANT CHANGE UP (ref 0–0)
PLATELET # BLD AUTO: 182 K/UL — SIGNIFICANT CHANGE UP (ref 150–400)
POTASSIUM SERPL-MCNC: 4 MMOL/L — SIGNIFICANT CHANGE UP (ref 3.5–5.3)
POTASSIUM SERPL-SCNC: 4 MMOL/L — SIGNIFICANT CHANGE UP (ref 3.5–5.3)
PROT SERPL-MCNC: 7.3 G/DL — SIGNIFICANT CHANGE UP (ref 6–8.3)
PROTHROM AB SERPL-ACNC: 12.1 SEC — SIGNIFICANT CHANGE UP (ref 9.9–13.4)
RBC # BLD: 4.5 M/UL — SIGNIFICANT CHANGE UP (ref 4.2–5.8)
RBC # FLD: 13.5 % — SIGNIFICANT CHANGE UP (ref 10.3–14.5)
SODIUM SERPL-SCNC: 140 MMOL/L — SIGNIFICANT CHANGE UP (ref 135–145)
TROPONIN T, HIGH SENSITIVITY RESULT: 28 NG/L — SIGNIFICANT CHANGE UP (ref 0–51)
TROPONIN T, HIGH SENSITIVITY RESULT: 32 NG/L — SIGNIFICANT CHANGE UP (ref 0–51)
WBC # BLD: 7.72 K/UL — SIGNIFICANT CHANGE UP (ref 3.8–10.5)
WBC # FLD AUTO: 7.72 K/UL — SIGNIFICANT CHANGE UP (ref 3.8–10.5)

## 2025-06-07 PROCEDURE — 70496 CT ANGIOGRAPHY HEAD: CPT | Mod: 26

## 2025-06-07 PROCEDURE — 99285 EMERGENCY DEPT VISIT HI MDM: CPT | Mod: GC

## 2025-06-07 PROCEDURE — 70498 CT ANGIOGRAPHY NECK: CPT | Mod: 26

## 2025-06-07 PROCEDURE — 70450 CT HEAD/BRAIN W/O DYE: CPT | Mod: 26,XU

## 2025-06-07 PROCEDURE — 93010 ELECTROCARDIOGRAM REPORT: CPT

## 2025-06-07 NOTE — ED ADULT NURSE NOTE - NSICDXPASTMEDICALHX_GEN_ALL_CORE_FT
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

## 2025-06-07 NOTE — ED ADULT NURSE NOTE - CHPI ED NUR SYMPTOMS NEG
03-Oct-2024 07:55 no discharge/no drainage/no eye lid swelling/no foreign body/no itching/no pain/no photophobia/no purulent drainage

## 2025-06-07 NOTE — CONSULT NOTE ADULT - ASSESSMENT
INCOMPLETE    ASSESSMENT:  79-year-old male presenting to [facility] ED 06/07/2025 for left eye blurriness which started yesterday at 9 AM.  Patient states his vision of the left eye has been blurry since. Was evaluated by Dr. Ava Stubbs who recommended patient be sent in for BRVO rule out.  Ophtho evaluation consistent w CRAO  vitals: 122/80    NIHSS: 0    NOT tenecteplase or LVO candidate dt oow    IMPRESSION:  CRAO wo any other neurologic symptom. Etiology likely atheroembolic given stenosis burden in arteries.    Recommendations:    Diagnostics:  [] MRI brain without contrast   [] TTE wo bubble   [] Implantable loop recorder  [] Lipid panel, HbA1c    Medications:  [] ASA 81 mg PO (325 per rectum if fails dysphagia)   [] Atorvastatin 80mg (titrate to LDL < 70)   [] Lovenox SQ for DVT prophylaxis     Miscellaneous:  [] NPO unless passes dysphagia screen; swallow eval if fails  [] Keep BP normotensive (<140/90)  [] Telemonitoring  [] Neurological checks and vital signs q4h  [] BGM goals 140-180    * Case and plan not final until Attending attestation * ASSESSMENT:  79-year-old male presenting to [facility] ED 06/07/2025 for left eye blurriness which started yesterday at 9 AM.  Patient states his vision of the left eye has been blurry since. Was evaluated by Dr. Ava Stubbs who recommended patient be sent in for BRVO rule out.  Ophtho evaluation consistent w CRAO  vitals: 122/80    NIHSS: 2 (LUE drift and L face)    NOT tenecteplase or LVO candidate dt oow    IMPRESSION:  CRAO w LUE and L face weakness. Localization possible to R brain dysfunction. Patient LEFT vasculature with great calcific stenosis burden than right but right anterior circulation w mild to mod stenosis. Etiology and mechanism of weakness pending further work up  LUE tremor unchanged from baseline.    Recommendations:    Diagnostics:  [] MRI brain without contrast   [] TTE wo bubble   [] Implantable loop recorder  [] Lipid panel, HbA1c    Medications:  [] ASA 81 mg PO (325 per rectum if fails dysphagia)   [] Atorvastatin 80mg (titrate to LDL < 70)   [] Lovenox SQ for DVT prophylaxis per primary    Miscellaneous:  [] NPO unless passes dysphagia screen; swallow eval if fails  [] Keep BP normotensive (<140/90)  [] Telemonitoring  [] Neurological checks and vital signs q4h  [] BGM goals 140-180  [] PTOT    * Case and plan not final until Attending attestation * ASSESSMENT:  79-year-old male presenting to [facility] ED 06/07/2025 for left eye blurriness which started yesterday at 9 AM.  Patient states his vision of the left eye has been blurry since. Was evaluated by Dr. Ava Stubbs who recommended patient be sent in for BRVO rule out.  Ophtho evaluation consistent w CRAO  vitals: 122/80    NIHSS: 2 (LUE drift and L face)    NOT tenecteplase or mechanical thrombectomy candidate dt oow    IMPRESSION:  CRAO w LUE and L face weakness. Localization possible to R brain dysfunction. Patient LEFT vasculature with great calcific stenosis burden than right but right anterior circulation w mild to mod stenosis. Etiology and mechanism of weakness pending further work up  LUE tremor unchanged from baseline.    Recommendations:    Diagnostics:  [] MRI brain without contrast   [] TTE wo bubble   [] Implantable loop recorder  [] Lipid panel, HbA1c    Medications:  [] ASA 81 mg PO (325 per rectum if fails dysphagia)   [] Atorvastatin 80mg (titrate to LDL < 70)   [] Lovenox SQ for DVT prophylaxis per primary    Miscellaneous:  [] NPO unless passes dysphagia screen; swallow eval if fails  [] Keep BP normotensive (<140/90)  [] Telemonitoring  [] Neurological checks and vital signs q4h  [] BGM goals 140-180  [] PTOT    * Case and plan not final until Attending attestation *

## 2025-06-07 NOTE — ED ADULT NURSE NOTE - NSICDXPASTSURGICALHX_GEN_ALL_CORE_FT
PAST SURGICAL HISTORY:  History of partial colectomy 2000    Incisional hernia repair 2001    Stented coronary artery LCX, LAD  5/23/05, Mid RCA 11/3/2009

## 2025-06-07 NOTE — ED ADULT NURSE NOTE - OBJECTIVE STATEMENT
79 y.o. male coming in from home via private car for blurred vision x 2 days. pt states that he noticed that he had left eye blurred vision yesterday at 9 AM, saw a retinal specialist who was able to tell him he had an occlusion in the left eye. pt states that he had a CT today and was told to come into the ER for farther evaluation. PMH cardiac stents on 81mg of aspirin every other day, HTN, CAD, diverticulitis. A&Ox3, vss, pt denies CP/SOB/weakness/dizziness/fevers/chills/N/V/D/urinary symptoms, bed in lowest position, call bell in reach and teaching on use completed, verbalized understanding of call bell use.

## 2025-06-07 NOTE — ED ADULT NURSE NOTE - ED STAT RN HANDOFF DETAILS
report given to JORGE LUIS freedman, neuro at bedside, pt wife at bedside, pt awaiting further dispo, vitals stable

## 2025-06-07 NOTE — ED ADULT NURSE REASSESSMENT NOTE - NS ED NURSE REASSESS COMMENT FT1
Report received from JORGE LUIS garcía. Pt alert & oriented x 3. Breathing spontaneous and nonlabored. on cardiac monitor. Neuro MD at bedside.  IV site patent, flushing without difficulty. Pt resting comfortably in bed and made aware of plan of care. Report received from JORGE LUIS garcía. Pt alert & oriented x 3. Breathing spontaneous and nonlabored. on cardiac monitor. Neuro MD at bedside. pt endorses left eye blurriness, denies pain in eye. neuro assement in chart. IV site patent, flushing without difficulty. Pt resting comfortably in bed and made aware of plan of care.

## 2025-06-07 NOTE — ED ADULT NURSE NOTE - CHIEF COMPLAINT QUOTE
Pt L eye blurry vision since yesterday @ 9am, went to optho and instructed to come to ED for further eval

## 2025-06-07 NOTE — ED PROVIDER NOTE - PHYSICAL EXAMINATION
CN2-12 grossly intact, A&Ox4, MS +5/5 in UE and LE BL, finger to nose smooth and rapid, gross sensation intact in UE and LE BL, gait smooth and coordinated, negative rhomberg, negative pronator drift  Eye: EOMI. Unable to determine pupil reactivity or visual acuity due to dilation that occurred in the ophthalmology office this morning.

## 2025-06-07 NOTE — ED ADULT TRIAGE NOTE - NS ED TRIAGE AVPU SCALE
Addended by: KEVIN SMITH on: 1/27/2023 09:04 AM     Modules accepted: Orders    
Alert-The patient is alert, awake and responds to voice. The patient is oriented to time, place, and person. The triage nurse is able to obtain subjective information.

## 2025-06-07 NOTE — CHART NOTE - NSCHARTNOTEFT_GEN_A_CORE
Pt was evaluated by [facility] attending (Dr. Unruly Cheung), affiliate of [facility] ophthalmology, in clinic today and was sent to the ED for further management of CRAO OS (out of the window) and CRVO OS.    HPI:  Sent by referring doctor (Ava Stubbs, OD) for assessment of suspected BRVO w/ edema. Pt reports blurry vision for over 24 hours in the left eye.       General Exam:  VA: 20/25-2 PHNI OD; 20/200-1 PHNI OS  Pupils: 3mm round and briskly reactive, no APD  IOP: 21 OD 16 OS  EOMs: Full  CVF: Full    Anterior Exam:   L/C/S: White and quiet OU  Cornea: Clear OU  AC: Deep and quiet OU  Iris: Flat, no NVI OU  Lens: 2+ NS, 1+ CC OU    Dilated Fundus Exam:   Disc: OD sharp, no disc edema or pallor, CDR 0.4; OS peripapillary nerve fiber layer hemorrhage, no disc edema, CDR 0.4  Vitreous: PVD OU  Vessels: OD normal caliber; OS dilated and tortuous venules, CRAO, CRVO  Macula: OD drusen, RPE changes, no hemorrhage, subretinal fluid, or edema; OS scattered DBH, retinal whitening, drusen, RPE changes, no subretinal fluid or edema  Periphery: No holes/tears, attached 360 OU      Imaging:  OCT Macula  OD: drusen, RPE changes, no evidence of macular edema or subretinal fluid, foveal thickness 289 microns  OS: Inner retinal reflectivity, drusen, RPE changes, no evidence of macular edema or subretinal fluid; foveal thickness 168 microns    Fluorescein Angiography  OD: normal AV transit, no leakage, hypofluorescence consistent with capillary nonperfusion  OS: delayed AV transit, no leakage, hypofluorescence consistent with capillary nonperfusion      Assessment & Plan:  # 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 Cheung for stroke workup  - Recommend full stroke workup, including CT head, CTA Head/Neck, TTE, etc.  - Recommend neurology consult  - Pt to follow up with Dr. Unruly Cheung (retina specialist, [facility]) at scheduled appointment on 7/24/25 at 8:00am    # Central Retinal Vein Occlusion in the Left Eye  - Recommend controlling blood pressure and other vascular risk factors  - No acute treatment needed at this time as there is no macular edema or neovascularization   - Continue to monitor    # Dry Age-Related Macular Degeneration in Both Eyes  - No choroidal neovascularization seen on exam or OCT testing  - Recommend AREDS2 vitamins Pt was evaluated by [facility] attending (Dr. Unruly Cheung), affiliate of [facility] ophthalmology, in clinic today and was sent to the ED for further management of CRAO OS (out of the window) and CRVO OS.    HPI:  Sent by referring doctor (Ava Sutbbs, OD) for assessment of suspected BRVO w/ edema. Pt reports blurry vision for over 24 hours in the left eye.       General Exam:  VA: 20/25-2 PHNI OD; 20/200-1 PHNI OS  Pupils: 3mm round and briskly reactive, no APD  IOP: 21 OD 16 OS  EOMs: Full  CVF: Full    Anterior Exam:   L/C/S: White and quiet OU  Cornea: Clear OU  AC: Deep and quiet OU  Iris: Flat, no NVI OU  Lens: 2+ NS, 1+ CC OU    Dilated Fundus Exam:   Disc: OD sharp, no disc edema or pallor, CDR 0.4; OS peripapillary nerve fiber layer hemorrhage, no disc edema, CDR 0.4  Vitreous: PVD OU  Vessels: OD normal caliber; OS dilated and tortuous venules, CRAO, CRVO  Macula: OD drusen, RPE changes, no hemorrhage, subretinal fluid, or edema; OS scattered DBH, retinal whitening, drusen, RPE changes, no subretinal fluid or edema  Periphery: No holes/tears, attached 360 OU      Imaging:  OCT Macula  OD: drusen, RPE changes, no evidence of macular edema or subretinal fluid, foveal thickness 289 microns  OS: Inner retinal reflectivity, drusen, RPE changes, no evidence of macular edema or subretinal fluid; foveal thickness 168 microns    Fluorescein Angiography  OD: normal AV transit, no leakage, hypofluorescence consistent with capillary nonperfusion  OS: delayed AV transit, no leakage, hypofluorescence consistent with capillary nonperfusion      Assessment & Plan:  # 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 Cheung for stroke workup  - Recommend full stroke workup, including CT head, CTA Head/Neck, TTE, etc.  - Recommend neurology consult  - Given patient's age, recommend ESR and CRP to evaluate for GCA  - Pt to follow up with Dr. 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 Unruly Cheung), affiliate of [facility] ophthalmology, in clinic today and was sent to the ED for further management of CRAO OS (out of the window) and CRVO OS.    HPI:  Sent by referring doctor (Ava Stubbs, OD) for assessment of suspected BRVO w/ edema. Pt reports blurry vision for over 24 hours in the left eye.       General Exam:  VA: 20/25-2 PHNI OD; 20/200-1 PHNI OS  Pupils: 3mm round and briskly reactive, no APD  IOP: 21 OD 16 OS  EOMs: Full  CVF: Full    Anterior Exam:   L/C/S: White and quiet OU  Cornea: Clear OU  AC: Deep and quiet OU  Iris: Flat, no NVI OU  Lens: 2+ NS, 1+ CC OU    Dilated Fundus Exam:   Disc: OD sharp, no disc edema or pallor, CDR 0.4; OS peripapillary nerve fiber layer hemorrhage, no disc edema, CDR 0.4  Vitreous: PVD OU  Vessels: OD normal caliber; OS dilated and tortuous venules, CRAO, CRVO  Macula: OD drusen, RPE changes, no hemorrhage, subretinal fluid, or edema; OS scattered DBH, retinal whitening, drusen, RPE changes, no subretinal fluid or edema  Periphery: No holes/tears, attached 360 OU      Imaging:  OCT Macula  OD: drusen, RPE changes, no evidence of macular edema or subretinal fluid, foveal thickness 289 microns  OS: Inner retinal reflectivity, drusen, RPE changes, no evidence of macular edema or subretinal fluid; foveal thickness 168 microns    Fluorescein Angiography  OD: normal AV transit, no leakage, hypofluorescence consistent with capillary nonperfusion  OS: delayed AV transit, no leakage, hypofluorescence consistent with capillary nonperfusion      Assessment & Plan:  # 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 Cheung for stroke workup  - Recommend full stroke workup, including CT head, CTA Head/Neck, TTE, etc.  - Recommend neurology consult  - Given patient's age, recommend ESR and CRP to evaluate for GCA  - Pt to follow up with Dr. Unruly Cheung (retina specialist, [facility]) at scheduled appointment on 7/24/25 at 8:00am  - 6/8 repeated DFE due to reported symptom of new flashes in the left eye which are now resolved. Fundus findings unchanged from previous with no holes/tears.     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

## 2025-06-07 NOTE — ED PROVIDER NOTE - PROGRESS NOTE DETAILS
Annie Duenas, Attending Physician: Patient signed out to Dr. Epps pending CTs and neuro recommendations. Delonte Simpson MD PGY-1: patient had some new vision changes with purple tint in the left eye with some flashers, ophtho notified, saw patient with nothing to do. Per neurology, will required admission for MRI and further eval.

## 2025-06-07 NOTE — CONSULT NOTE ADULT - SUBJECTIVE AND OBJECTIVE BOX
**STROKE CONSULT NOTE**    Last known well time/Time of onset of symptoms: 0900 06/06/2025    PMHx:  diabetes, hypertension, hyperlipidemia, CAD     HPI  79-year-old male presenting to [facility] ED 06/07/2025 for left eye blurriness which started yesterday at 9 AM.  Patient states his vision of the left eye has been blurry since. Was evaluated by Dr. Ava Stubbs who recommended patient be sent in for BRVO rule out.  Ophtho consulted. Ophtho exam:  VA: 20/25-2 PHNI OD; 20/200-1 PHNI OS  Pupils: 3mm round and briskly reactive, no APD  IOP: 21 OD 16 OS  Disc: OD sharp, no disc edema or pallor, CDR 0.4; OS peripapillary nerve fiber layer hemorrhage, no disc edema, CDR 0.4  Vitreous: PVD OU  Vessels: OD normal caliber; OS dilated and tortuous venules, CRAO, CRVO  Macula: OD drusen, RPE changes, no hemorrhage, subretinal fluid, or edema; OS scattered DBH, retinal whitening, drusen, RPE changes, no subretinal fluid or edema  *The above exam consistent w CRAO    Neurology consulted. Patient otherwise asx and without focal neurologic deficits.    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"      Stented coronary artery  LCX, LAD  5/23/05, Mid RCA 11/3/2009      History of partial colectomy  2000      Incisional hernia repair  2001          FAMILY HISTORY:      SOCIAL HISTORY:  ***    ROS:  no headache, difficulty swallowing, chest pain, palpitations, SOB, cough, n/v, dysuria, hematuria, blood in stool, numbness, weakness      MEDICATIONS  (STANDING):    MEDICATIONS  (PRN):      Allergies    penicillins (Stomach Upset; Nausea)    Intolerances        Vital Signs Last 24 Hrs  T(C): 36.6 (07 Jun 2025 16:50), Max: 36.6 (07 Jun 2025 14:56)  T(F): 97.8 (07 Jun 2025 16:50), Max: 97.9 (07 Jun 2025 14:56)  HR: 64 (07 Jun 2025 16:50) (56 - 64)  BP: 122/80 (07 Jun 2025 16:50) (122/80 - 135/79)  BP(mean): --  RR: 18 (07 Jun 2025 16:50) (18 - 20)  SpO2: 98% (07 Jun 2025 16:50) (96% - 98%)    Parameters below as of 07 Jun 2025 16:50  Patient On (Oxygen Delivery Method): room air        PHYSICAL EXAM:  Constitutional: WDWN; NAD    Neurologic:  Mental status: Awake, alert and oriented x3.  Recent and remote memory intact.  Naming, repetition and comprehension intact.  Attention/concentration intact. Speech fluent and no errors on phoneme assessment.  Fund of knowledge appropriate.    Cranial nerves: pupils equally round and reactive to light, visual fields full, visual impairment consistent with optho exam, no nystagmus, extraocular muscles intact, V1 through V3 intact bilaterally and symmetric, face symmetric, hearing intact to voice, palate elevation symmetric, tongue was midline, sternocleidomastoid/shoulder shrug strength bilaterally 5/5.    Motor:  Normal bulk and tone, strength 5/5 in bilateral upper and lower extremities.   strength 5/5.  Rapid alternating movements intact and symmetric.   Sensation: Intact to light touch thru out and symmetric  Coordination: No dysmetria on finger-to-nose and heel-to-shin.  No clumsiness.  Reflexes: 2+ in upper and lower extremities, downgoing toes bilaterally  Gait: Narrow and steady. No ataxia.  Romberg negative    NIHSS: 0    Fingerstick Blood Glucose: CAPILLARY BLOOD GLUCOSE      POCT Blood Glucose.: 98 mg/dL (07 Jun 2025 16:49)       LABS:                        13.8   7.72  )-----------( 182      ( 07 Jun 2025 16:42 )             42.5     06-07    140  |  102  |  22  ----------------------------<  95  4.0   |  23  |  0.97    Ca    9.9      07 Jun 2025 16:42    TPro  7.3  /  Alb  4.4  /  TBili  0.6  /  DBili  x   /  AST  21  /  ALT  16  /  AlkPhos  59  06-07    PT/INR - ( 07 Jun 2025 16:42 )   PT: 12.1 sec;   INR: 1.05 ratio         PTT - ( 07 Jun 2025 16:42 )  PTT:30.4 sec      Urinalysis Basic - ( 07 Jun 2025 16:42 )    Color: x / Appearance: x / SG: x / pH: x  Gluc: 95 mg/dL / Ketone: x  / Bili: x / Urobili: x   Blood: x / Protein: x / Nitrite: x   Leuk Esterase: x / RBC: x / WBC x   Sq Epi: x / Non Sq Epi: x / Bacteria: x        RADIOLOGY & ADDITIONAL STUDIES:    06/07/2025  CTH: without acute hemorrhage, hypodensity, or mass effect  CTA head and neck: mod to severe calcified plaque in BLE cavernous ICA (L>R), mod calcified plaque in lacerum portion of L ICA, mild to mod calcified plaque in super cervical portion of ICA prior to entry in foramen lacerum, mild calcified plaque in R ICA just superior to bifurcation, mild stenosis at R CC bifurcation, mod stenosis at L CC bifurcation  mod to severe stenosis in L VA V4 segment    IV-tenecteplase(Y/N):           N  Reason IV-tenecteplase not given: oow   **STROKE CONSULT NOTE**    Last known well time/Time of onset of symptoms: 0900 06/06/2025    PMHx:  diabetes, hypertension, hyperlipidemia, CAD     HPI  79-year-old male presenting to [facility] ED 06/07/2025 for left eye blurriness which started yesterday at 9 AM.  Patient states his vision of the left eye has been blurry since. Was evaluated by Dr. Ava Stubbs who recommended patient be sent in for BRVO rule out.  Ophtho consulted. Ophtho exam:  VA: 20/25-2 PHNI OD; 20/200-1 PHNI OS  Pupils: 3mm round and briskly reactive, no APD  IOP: 21 OD 16 OS  Disc: OD sharp, no disc edema or pallor, CDR 0.4; OS peripapillary nerve fiber layer hemorrhage, no disc edema, CDR 0.4  Vitreous: PVD OU  Vessels: OD normal caliber; OS dilated and tortuous venules, CRAO, CRVO  Macula: OD drusen, RPE changes, no hemorrhage, subretinal fluid, or edema; OS scattered DBH, retinal whitening, drusen, RPE changes, no subretinal fluid or edema  *The above exam consistent w CRAO    Neurology consulted. Patient reports a few months of drooling out of the corners of his mouth (BL). 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"      Stented coronary artery  LCX, LAD  5/23/05, Mid RCA 11/3/2009      History of partial colectomy  2000      Incisional hernia repair  2001          FAMILY HISTORY:        ROS:  no headache, double vision, difficulty swallowing, chest pain, palpitations, SOB, cough, n/v, dysuria, hematuria, blood in stool, numbness, weakness          MEDICATIONS  (STANDING):    MEDICATIONS  (PRN):      Allergies    penicillins (Stomach Upset; Nausea)    Intolerances        Vital Signs Last 24 Hrs  T(C): 36.6 (07 Jun 2025 16:50), Max: 36.6 (07 Jun 2025 14:56)  T(F): 97.8 (07 Jun 2025 16:50), Max: 97.9 (07 Jun 2025 14:56)  HR: 64 (07 Jun 2025 16:50) (56 - 64)  BP: 122/80 (07 Jun 2025 16:50) (122/80 - 135/79)  BP(mean): --  RR: 18 (07 Jun 2025 16:50) (18 - 20)  SpO2: 98% (07 Jun 2025 16:50) (96% - 98%)    Parameters below as of 07 Jun 2025 16:50  Patient On (Oxygen Delivery Method): room air        PHYSICAL EXAM:  Constitutional: WDWN; NAD    Neurologic:  Mental status: Awake, alert and oriented x3.  Recent and remote memory intact.  Naming, repetition and comprehension intact.  Attention/concentration intact. Speech fluent and no errors on phoneme assessment.  Fund of knowledge appropriate.    Cranial nerves: pupils equally round and reactive to light, visual fields full, visual impairment consistent with optho exam, no nystagmus, extraocular muscles intact, V1 through V3 intact bilaterally and symmetric, subtle decreased elevation of LEFT oral commissure, hearing intact to voice, palate elevation symmetric, tongue was midline, sternocleidomastoid/shoulder shrug strength bilaterally 5/5.    Motor:  Normal bulk and tone, strength 5/5 in RUE and BLE, 4+/5 and subtle drift wo bed hitting in LUE.   strength 5/5.  Rapid alternating movements intact and symmetric.   Sensation: Intact to light touch thru out and symmetric  Coordination: No dysmetria on finger-to-nose and heel-to-shin.  No clumsiness.  Reflexes: patella BL 3+, L achilles 2+, R achilles 1+, toes down BL, RUE 2+, LUE 3+  Gait: Narrow and steady.    NIHSS: 2 (L face and LUE)    Fingerstick Blood Glucose: CAPILLARY BLOOD GLUCOSE      POCT Blood Glucose.: 98 mg/dL (07 Jun 2025 16:49)       LABS:                        13.8   7.72  )-----------( 182      ( 07 Jun 2025 16:42 )             42.5     06-07    140  |  102  |  22  ----------------------------<  95  4.0   |  23  |  0.97    Ca    9.9      07 Jun 2025 16:42    TPro  7.3  /  Alb  4.4  /  TBili  0.6  /  DBili  x   /  AST  21  /  ALT  16  /  AlkPhos  59  06-07    PT/INR - ( 07 Jun 2025 16:42 )   PT: 12.1 sec;   INR: 1.05 ratio         PTT - ( 07 Jun 2025 16:42 )  PTT:30.4 sec      Urinalysis Basic - ( 07 Jun 2025 16:42 )    Color: x / Appearance: x / SG: x / pH: x  Gluc: 95 mg/dL / Ketone: x  / Bili: x / Urobili: x   Blood: x / Protein: x / Nitrite: x   Leuk Esterase: x / RBC: x / WBC x   Sq Epi: x / Non Sq Epi: x / Bacteria: x        RADIOLOGY & ADDITIONAL STUDIES:    06/07/2025  CTH: without acute hemorrhage, hypodensity, or mass effect  CTA head and neck: mod to severe calcified plaque in BLE cavernous ICA (L>R), mod calcified plaque in lacerum portion of L ICA, mild to mod calcified plaque in super cervical portion of ICA prior to entry in foramen lacerum, mild calcified plaque in R ICA just superior to bifurcation, mild stenosis at R CC bifurcation, mod stenosis at L CC bifurcation  mod to severe stenosis in L VA V4 segment    IV-tenecteplase(Y/N):           N  Reason IV-tenecteplase not given: oow

## 2025-06-07 NOTE — ED PROVIDER NOTE - ATTENDING CONTRIBUTION TO CARE
see mdm    edited by Annie Duenas DO - attending physician.   Please see progress notes for status/labs/consult updates and ED course after initial presentation.

## 2025-06-07 NOTE — CONSULT NOTE ADULT - ATTENDING COMMENTS
DOS 6/8  Gulshan      79-year-old male presenting to [facility] ED 06/07/2025 for left eye blurriness which started day PTA at 9 AM.  Patient states his vision of the left eye has been blurry since. Was evaluated by Dr. Ava Stubbs who recommended patient be sent in for BRVO rule out.  Ophtho evaluation consistent w CRAO  vitals: 122/80  NIHSS: 2 (LUE drift and L face)  NOT tenecteplase or mechanical thrombectomy candidate dt oow  prior ELBA scan neg (ordered by Joshua)   CTH neg   CTA H?N with intracranial ICA stensois noted. 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 Localization possible to R brain dysfunction. Patient LEFT vasculature with great calcific stenosis burden than right but right anterior circulation w mild to mod stenosis. Etiology and mechanism of weakness pending further work up  LUE tremor unchanged from baseline.  ICAD  ICA stesnoiss B/L     Recommendations:     - f/u optho  - check CD  - ESR/CRP   - Dual antiplatelet therapy with ASA 81mg PO daily and Clopidogrel 75mg PO daily x 3 weeks followed by monotherapy with ASA 81mg PO daily.  - High dose statin therapy - atorvastatin 40mg PO daily. LDL goal <70mg/dL.  - MRI brain w/o  - Hemoglobin A1c and lipid panel  - TTE  - will need extended cardiac montioring   - telemetry  - PT/OT/SS/SLP, OOBC  - permissive HTN, -180mmHg x24hrs then nmormoten alysha   - check FS, glucose control <180  - GI/DVT ppx  - Counseling on diet, exercise, and medication adherence was done  - Counseling on smoking cessation and alcohol consumption offered when appropriate.  - Pain assessed and judicious use of narcotics when appropriate was discussed.    - Stroke education given when appropriate.  - Importance of fall prevention discussed.   - Differential diagnosis and plan of care discussed with patient and/or family and primary team  - Thank you for allowing me to participate in the care of this patient. Call with questions.   Jony Lyons MD  Vascular Neurology  Office: [phone]

## 2025-06-07 NOTE — ED PROVIDER NOTE - CLINICAL SUMMARY MEDICAL DECISION MAKING FREE TEXT BOX
79-year-old male past medical history of diabetes, hypertension, hyperlipidemia, CAD presenting due to left eye blurriness which started yesterday at 9 AM.  Patient states his vision of the left eye has been blurry since then and he went to a ophthalmologist who performed a funduscopy and angiography of the eye which showed signs of CRAO.  Patient was advised to come to emergency department.  Denies any history of strokes.  No weakness or numbness to extremities, disorientation, dizziness, ataxia.    Patient is well-appearing in the emergency department.  AO x 3 and afebrile.  Hemodynamically stable.  Lungs clear to auscultation.  Heart has normal rate and rhythm.  Unable to perform an Maciej test at this time due to recent dilation of eyes performed today.  Extraocular range of motion intact.  Neuroexam grossly normal.  Will consult neurology and ophthalmology for further recommendations.  Will order stroke workup including labs, CBC, CMP, coags, CT head, CTA head and neck.

## 2025-06-07 NOTE — ED ADULT TRIAGE NOTE - CHIEF COMPLAINT QUOTE
I have entered 2 more codes for the TSH with reflex so it will be covered. I can add it to the blood work that was already drawn if agreeable.    Pt L eye blurry vision since yesterday @ 9am, went to optho and instructed to come to ED for further eval

## 2025-06-08 DIAGNOSIS — I10 ESSENTIAL (PRIMARY) HYPERTENSION: ICD-10-CM

## 2025-06-08 DIAGNOSIS — H34.12 CENTRAL RETINAL ARTERY OCCLUSION, LEFT EYE: ICD-10-CM

## 2025-06-08 DIAGNOSIS — I25.10 ATHEROSCLEROTIC HEART DISEASE OF NATIVE CORONARY ARTERY WITHOUT ANGINA PECTORIS: ICD-10-CM

## 2025-06-08 DIAGNOSIS — Z29.9 ENCOUNTER FOR PROPHYLACTIC MEASURES, UNSPECIFIED: ICD-10-CM

## 2025-06-08 LAB
A1C WITH ESTIMATED AVERAGE GLUCOSE RESULT: 5.7 % — HIGH (ref 4–5.6)
ANION GAP SERPL CALC-SCNC: 14 MMOL/L — SIGNIFICANT CHANGE UP (ref 5–17)
BUN SERPL-MCNC: 18 MG/DL — SIGNIFICANT CHANGE UP (ref 7–23)
CALCIUM SERPL-MCNC: 9.7 MG/DL — SIGNIFICANT CHANGE UP (ref 8.4–10.5)
CHLORIDE SERPL-SCNC: 105 MMOL/L — SIGNIFICANT CHANGE UP (ref 96–108)
CHOLEST SERPL-MCNC: 117 MG/DL — SIGNIFICANT CHANGE UP
CO2 SERPL-SCNC: 21 MMOL/L — LOW (ref 22–31)
CREAT SERPL-MCNC: 0.78 MG/DL — SIGNIFICANT CHANGE UP (ref 0.5–1.3)
CRP SERPL-MCNC: <3 MG/L — SIGNIFICANT CHANGE UP (ref 0–4)
EGFR: 92 ML/MIN/1.73M2 — SIGNIFICANT CHANGE UP
EGFR: 92 ML/MIN/1.73M2 — SIGNIFICANT CHANGE UP
ERYTHROCYTE [SEDIMENTATION RATE] IN BLOOD: 18 MM/HR — HIGH (ref 0–15)
ESTIMATED AVERAGE GLUCOSE: 117 MG/DL — HIGH (ref 68–114)
GLUCOSE SERPL-MCNC: 77 MG/DL — SIGNIFICANT CHANGE UP (ref 70–99)
HDLC SERPL-MCNC: 36 MG/DL — LOW
LDLC SERPL-MCNC: 57 MG/DL — SIGNIFICANT CHANGE UP
LIPID PNL WITH DIRECT LDL SERPL: 57 MG/DL — SIGNIFICANT CHANGE UP
NONHDLC SERPL-MCNC: 80 MG/DL — SIGNIFICANT CHANGE UP
POTASSIUM SERPL-MCNC: 3.6 MMOL/L — SIGNIFICANT CHANGE UP (ref 3.5–5.3)
POTASSIUM SERPL-SCNC: 3.6 MMOL/L — SIGNIFICANT CHANGE UP (ref 3.5–5.3)
SODIUM SERPL-SCNC: 140 MMOL/L — SIGNIFICANT CHANGE UP (ref 135–145)
TRIGL SERPL-MCNC: 127 MG/DL — SIGNIFICANT CHANGE UP

## 2025-06-08 PROCEDURE — 99223 1ST HOSP IP/OBS HIGH 75: CPT

## 2025-06-08 PROCEDURE — 70551 MRI BRAIN STEM W/O DYE: CPT | Mod: 26

## 2025-06-08 RX ORDER — METOPROLOL SUCCINATE 50 MG/1
1 TABLET, EXTENDED RELEASE ORAL
Refills: 0 | DISCHARGE

## 2025-06-08 RX ORDER — ATORVASTATIN CALCIUM 80 MG/1
80 TABLET, FILM COATED ORAL AT BEDTIME
Refills: 0 | Status: DISCONTINUED | OUTPATIENT
Start: 2025-06-08 | End: 2025-06-10

## 2025-06-08 RX ORDER — METOPROLOL SUCCINATE 50 MG/1
50 TABLET, EXTENDED RELEASE ORAL DAILY
Refills: 0 | Status: DISCONTINUED | OUTPATIENT
Start: 2025-06-09 | End: 2025-06-10

## 2025-06-08 RX ORDER — ASPIRIN 325 MG
81 TABLET ORAL DAILY
Refills: 0 | Status: DISCONTINUED | OUTPATIENT
Start: 2025-06-08 | End: 2025-06-10

## 2025-06-08 RX ORDER — ASPIRIN 325 MG
1 TABLET ORAL
Refills: 0 | DISCHARGE

## 2025-06-08 RX ORDER — ENOXAPARIN SODIUM 100 MG/ML
40 INJECTION SUBCUTANEOUS EVERY 24 HOURS
Refills: 0 | Status: DISCONTINUED | OUTPATIENT
Start: 2025-06-08 | End: 2025-06-10

## 2025-06-08 RX ORDER — LOSARTAN POTASSIUM 100 MG/1
0 TABLET, FILM COATED ORAL
Refills: 0 | DISCHARGE

## 2025-06-08 RX ORDER — ACETAMINOPHEN 500 MG/5ML
650 LIQUID (ML) ORAL EVERY 6 HOURS
Refills: 0 | Status: DISCONTINUED | OUTPATIENT
Start: 2025-06-08 | End: 2025-06-10

## 2025-06-08 RX ORDER — OMEPRAZOLE 20 MG/1
1 CAPSULE, DELAYED RELEASE ORAL
Refills: 0 | DISCHARGE

## 2025-06-08 RX ORDER — ASPIRIN 325 MG
0 TABLET ORAL
Refills: 0 | DISCHARGE

## 2025-06-08 RX ORDER — METOPROLOL SUCCINATE 50 MG/1
100 TABLET, EXTENDED RELEASE ORAL DAILY
Refills: 0 | Status: DISCONTINUED | OUTPATIENT
Start: 2025-06-08 | End: 2025-06-08

## 2025-06-08 RX ADMIN — METOPROLOL SUCCINATE 100 MILLIGRAM(S): 50 TABLET, EXTENDED RELEASE ORAL at 06:05

## 2025-06-08 RX ADMIN — ATORVASTATIN CALCIUM 80 MILLIGRAM(S): 80 TABLET, FILM COATED ORAL at 21:17

## 2025-06-08 RX ADMIN — Medication 81 MILLIGRAM(S): at 12:15

## 2025-06-08 RX ADMIN — ENOXAPARIN SODIUM 40 MILLIGRAM(S): 100 INJECTION SUBCUTANEOUS at 06:05

## 2025-06-08 NOTE — OCCUPATIONAL THERAPY INITIAL EVALUATION ADULT - LIVES WITH, PROFILE
Pvt home, 4 steps to enter. 1 flight to bed and bath. (I) ADLs and functional transfers without AD/DME. +/spouse

## 2025-06-08 NOTE — H&P ADULT - NSHPPHYSICALEXAM_GEN_ALL_CORE
Vital Signs Last 24 Hrs  T(C): 36.3 (08 Jun 2025 01:00), Max: 36.7 (07 Jun 2025 23:10)  T(F): 97.3 (08 Jun 2025 01:00), Max: 98 (07 Jun 2025 23:10)  HR: 67 (08 Jun 2025 01:00) (56 - 67)  BP: 151/81 (08 Jun 2025 01:00) (121/78 - 151/81)  BP(mean): 92 (07 Jun 2025 19:04) (92 - 92)  RR: 18 (08 Jun 2025 01:00) (17 - 20)  SpO2: 93% (08 Jun 2025 01:00) (93% - 98%)    Parameters below as of 08 Jun 2025 01:00  Patient On (Oxygen Delivery Method): room air Vital Signs Last 24 Hrs  T(C): 36.3 (08 Jun 2025 01:00), Max: 36.7 (07 Jun 2025 23:10)  T(F): 97.3 (08 Jun 2025 01:00), Max: 98 (07 Jun 2025 23:10)  HR: 67 (08 Jun 2025 01:00) (56 - 67)  BP: 151/81 (08 Jun 2025 01:00) (121/78 - 151/81)  BP(mean): 92 (07 Jun 2025 19:04) (92 - 92)  RR: 18 (08 Jun 2025 01:00) (17 - 20)  SpO2: 93% (08 Jun 2025 01:00) (93% - 98%)    Parameters below as of 08 Jun 2025 01:00  Patient On (Oxygen Delivery Method): room air    GENERAL: No acute distress, well-developed  HEAD:  Atraumatic, Normocephalic  ENT: EOMI, PERRLA, conjunctiva and sclera clear,  moist mucosa no pharyngeal erythema or exudates   NECK: supple , no JVD   CHEST/LUNG: Clear to auscultation bilaterally; No wheeze, equal breath sounds bilaterally   BACK: No spinal tenderness,  No CVA tenderness   HEART: Regular rate and rhythm; No murmurs, rubs, or gallops  ABDOMEN: Soft, Nontender, Nondistended; Bowel sounds present  EXTREMITIES:  No clubbing, cyanosis, or edema  MSK: No joint swelling or effusions, ROM intact   PSYCH: Normal behavior/affect  NEUROLOGY: AAOx2, resting tremor on Left upper extremity ,  decreased vision on L eye , motor and sensorium grossly intact ,   , cranial nerves intact  SKIN: Normal color, No rashes or lesions

## 2025-06-08 NOTE — SPEECH LANGUAGE PATHOLOGY EVALUATION - SLP VISUAL PERCEPTION AND PROCESSING
Pt states "My vision isn't as cloudy as it was yesterday."; endorses double vision when reading a text.

## 2025-06-08 NOTE — PATIENT PROFILE ADULT - NSPROPTRIGHTSUPPORTPERSON_GEN_A_NUR
declines

## 2025-06-08 NOTE — PHYSICAL THERAPY INITIAL EVALUATION ADULT - PERTINENT HX OF CURRENT PROBLEM, REHAB EVAL
79M w/pmh diabetes, hypertension, hyperlipidemia, CAD p/w   left eye blurriness x 2 days , opthal exam c/w central retinal artery occlusion.

## 2025-06-08 NOTE — H&P ADULT - NSHPREVIEWOFSYSTEMS_GEN_ALL_CORE
CONSTITUTIONAL: No weakness, fevers or chills  EYES/ENT: +  visual changes;  No dysphagia  NECK: No pain or stiffness  RESPIRATORY: No cough, wheezing, hemoptysis; No shortness of breath  CARDIOVASCULAR: No chest pain or palpitations; No lower extremity edema  EXTREMITIES: no le edema, cyanosis, clubbing  GASTROINTESTINAL: No abdominal or epigastric pain. No nausea, vomiting, or hematemesis; No diarrhea or constipation. No melena or hematochezia.  BACK: No back pain  GENITOURINARY: No dysuria, frequency or hematuria  NEUROLOGICAL: No numbness or weakness  MSK: no joint swelling or pain  SKIN: No itching, burning, rashes, or lesions   PSYCH: no agitation  All other review of systems is negative unless indicated above.

## 2025-06-08 NOTE — PROGRESS NOTE ADULT - ASSESSMENT
79-yr  m     presenting to  er,  06/07/2025 for left eye blurriness  for  a  day      .  seen by  optho  by Dr. Ava Stubbs who recommended patient be sent in for BRVO rule out.    Ophtho evaluation consistent w CRAO    seen by  neuro ,  had  LUE drift and L face droop,  not  obvious  now    c/c  benign tremors,  left arm    ct head,  no acute stroke     MRI brain      w/p per neuro, echo, ILR    on  asa/  lipitor/  card  f/p/  ILR    HTN,  lower  torpol     family at bedside   pt  is  full code

## 2025-06-08 NOTE — H&P ADULT - TIME BILLING
X-ray of the lumbar spine shows no compression fracture.  Significant arthritis changes most pronounced at L5-S1, lowest part of lumbar spine.  Continue with daily calcium and vitamin D and home exercises as discussed in appointment
Chart review , case discussion with ED other providers , obtain history  ,  examination of patient , answering questions and concerns , ordering labs and medications , and documentation

## 2025-06-08 NOTE — OCCUPATIONAL THERAPY INITIAL EVALUATION ADULT - PERTINENT HX OF CURRENT PROBLEM, REHAB EVAL
Patient is a 79-year-old male past medical history of diabetes, hypertension, hyperlipidemia, CAD presents for  left eye blurriness  which started on day prior to admission. Patient reports blurry vision in his left eye  for the past two days. He was evaluated by ophthalmology   and fundoscopy and angiography of the eye which showed signs of CRAO.  He was subsequently advised to come to the hospital. He reports focal motor weakness or numbness , no lightheadedness , no slurred speech.   CT HEAD: Age-appropriate involutional changes. Atherosclerotic changes. No acute intracranial hemorrhage, mass effect, or midline shift. Consider further evaluation via dedicated ophthalmologic assessment.  CTA NECK: No evidence of significant stenosis or occlusion.  CTA HEAD:1. No large vessel occlusion. 2. Deposition of calcified plaque in association with the cavernous and supraclinoid portions of bilateral internal carotid arteries with mild to   moderate stenosis bilaterally in these locations. 3 .No aneurysm identified. Tiny aneurysms can be beyond the resolution of CTA technique.

## 2025-06-08 NOTE — OCCUPATIONAL THERAPY INITIAL EVALUATION ADULT - DIAGNOSIS, OT EVAL
Patient with deficits in IADL/ADL status due to impaired vision, and hx benign essential tremor L hand

## 2025-06-08 NOTE — SPEECH LANGUAGE PATHOLOGY EVALUATION - SLP DIAGNOSIS
Detail Level: Simple Comment: I do not think that this is panniculitis.  The lesions are too transient. 79yoM p/w L eye blurriness, opthal exam c/w CRAO. Pt p/w grossly intact speech, language, and cognitive skills. No evidence of aphasia, dysarthria, or apraxia. Speech is fluent and intelligible at the conversational level. Cognitive-linguistic skills WFL for higher-level tasks (i.e. medication management). Reading and writing intact. Pt reports that he is at baseline in regards to cognitive-communication skills. Service to sign off; ACP Jami Estrada made aware.

## 2025-06-08 NOTE — PATIENT PROFILE ADULT - FALL HARM RISK - HARM RISK INTERVENTIONS

## 2025-06-08 NOTE — SPEECH LANGUAGE PATHOLOGY EVALUATION - SLP PATIENT/FAMILY GOALS STATEMENT
Pt denies new changes in speech, language, voice or cognition as compared to prior to admission. Pt endorses recent difficulty "pulling up the word" related to general aging. Independent with ADLs PTA.

## 2025-06-08 NOTE — CONSULT NOTE ADULT - SUBJECTIVE AND OBJECTIVE BOX
Date of Service, 06-08-25 @ 12:37  CHIEF COMPLAINT:Patient is a 79y old  Male who presents with a chief complaint of CRA occlusion of left eye (08 Jun 2025 02:06)      HPI:  Patient is a 79-year-old male past medical history of diabetes, hypertension, hyperlipidemia, CAD presents for  left eye blurriness  which started on day prior to admission.   Patient reports blurry vision in his left eye  for the past two days. He was evaluated by ophthalmology   and fundoscopy and angiography of the eye which showed signs of CRAO.  He was subsequently advised to come to the hospital. He reports focal motor weakness or numbness , no lightheadedness , no slurred speech.     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 650 milliGRAM(s) Oral every 6 hours PRN Temp greater or equal to 38C (100.4F), Mild Pain (1 - 3)      FAMILY HISTORY:      SOCIAL HISTORY:    [x ] Non-smoker  [ ] Smoker  [ ] Alcohol    Allergies    penicillins (Stomach Upset; Nausea)    Intolerances    	    REVIEW OF SYSTEMS:  CONSTITUTIONAL: No fever, weight loss, or fatigue  EYES: No eye pain, visual disturbances, or discharge  ENT:  No difficulty hearing, tinnitus, vertigo; No sinus or throat pain  NECK: No pain or stiffness  RESPIRATORY: No cough, wheezing, chills or hemoptysis; + Shortness of Breath  CARDIOVASCULAR: No chest pain, palpitations, passing out, dizziness, or leg swelling  GASTROINTESTINAL: No abdominal or epigastric pain. No nausea, vomiting, or hematemesis; No diarrhea or constipation. No melena or hematochezia.  GENITOURINARY: No dysuria, frequency, hematuria, or incontinence  NEUROLOGICAL: No headaches, memory loss, loss of strength, numbness, or tremors  SKIN: No itching, burning, rashes, or lesions   LYMPH Nodes: No enlarged glands  ENDOCRINE: No heat or cold intolerance; No hair loss  MUSCULOSKELETAL: No joint pain or swelling; No muscle, back, or extremity pain  PSYCHIATRIC: No depression, anxiety, mood swings, or difficulty sleeping  HEME/LYMPH: No easy bruising, or bleeding gums  ALLERGY AND IMMUNOLOGIC: No hives or eczema	    [x ] All others negative	  [ ] Unable to obtain    PHYSICAL EXAM:  T(C): 36.4 (06-08-25 @ 04:00), Max: 36.7 (06-07-25 @ 23:10)  HR: 83 (06-08-25 @ 09:47) (56 - 83)  BP: 128/71 (06-08-25 @ 09:47) (106/66 - 151/81)  RR: 18 (06-08-25 @ 04:00) (17 - 20)  SpO2: 93% (06-08-25 @ 09:47) (92% - 98%)  Wt(kg): --  I&O's Summary      Appearance: Normal	  HEENT:   Normal oral mucosa, PERRL, EOMI	  Lymphatic: No lymphadenopathy  Cardiovascular: Normal S1 S2, No JVD, + murmurs, No edema  Respiratory: rhonchi  Gastrointestinal:  Soft, Non-tender, + BS	  Skin: No rashes, No ecchymoses, No cyanosis	  Extremities: Normal range of motion, No clubbing, cyanosis or edema  Vascular: Peripheral pulses palpable 2+ bilaterally    TELEMETRY: 	    ECG:  	  RADIOLOGY:  OTHER: 	  	  LABS:	 	    CARDIAC MARKERS:                              13.8   7.72  )-----------( 182      ( 07 Jun 2025 16:42 )             42.5     06-08    140  |  105  |  18  ----------------------------<  77  3.6   |  21[L]  |  0.78    Ca    9.7      08 Jun 2025 07:03    TPro  7.3  /  Alb  4.4  /  TBili  0.6  /  DBili  x   /  AST  21  /  ALT  16  /  AlkPhos  59  06-07    proBNP:   Lipid Profile: Cholesterol 117  LDL --  HDL 36      HgA1c:   TSH:   PT/INR - ( 07 Jun 2025 16:42 )   PT: 12.1 sec;   INR: 1.05 ratio         PTT - ( 07 Jun 2025 16:42 )  PTT:30.4 sec    PREVIOUS DIAGNOSTIC TESTING:      < from: 12 Lead ECG (06.07.25 @ 16:48) >  Diagnosis Line SINUS RHYTHM WITH MARKED SINUS ARRHYTHMIA  BORDERLINE ECG  WHEN COMPARED WITH ECG OF 14-DEC-2014 09:59,  no  SIGNIFICANT CHANGES HAVE OCCURRED      Diagnostics:  [] MRI brain without contrast   [] TTE wo bubble   [] Implantable loop recorder  [] Lipid panel, HbA1c    < from: Transthoracic Echocardiogram w/ Doppler (10.30.09 @ 15:27) >  1. Endocardialvisualization enhanced with intravenous  injection of echo contrast (Definity). Normal left  ventricular systolic function.  Mild diastolic dysfunction  (Stage I).  2. Normal right ventricular size and systolic function.  3. Minimal tricuspid regurgitation. Estimated pulmonary  artery systolic pressure equals 34 mm Hg, assuming right  atrial pressure equals 10  mm Hg.    < from: CT Angio Neck w/ IV Cont (06.07.25 @ 17:52) >  CT HEAD: Age-appropriate involutional changes. Atherosclerotic changes.  No acute intracranial hemorrhage, mass effect, or midline shift. Consider   further evaluation via dedicated ophthalmologic assessment.    CTA NECK:  No evidence of significant stenosis or occlusion.    CTA HEAD:  1. No large vessel occlusion.  2. Deposition of calcified plaque in association with the cavernous and   supraclinoid portions of bilateral internal carotid arteries with mild to   moderate stenosis bilaterally in these locations.  3 .No aneurysm identified. Tiny aneurysms can be beyond the resolution of   CTA technique.

## 2025-06-08 NOTE — SPEECH LANGUAGE PATHOLOGY EVALUATION - SLP PERTINENT HISTORY OF CURRENT PROBLEM
79M w/pmh diabetes, hypertension, hyperlipidemia, CAD p/w left eye blurriness x 2 days, opthal exam c/w CRAO, sent in by Dr. Cheung for stroke workup. Neuro consulted: NIHSS: 2 (LUE drift and L face); NOT tenecteplase or mechanical thrombectomy candidate dt oow. IMPRESSION: CRAO w LUE and L face weakness. Localization possible to R brain dysfunction. Patient LEFT vasculature with great calcific stenosis burden than right but right anterior circulation w mild to mod stenosis. Etiology and mechanism of weakness pending further work up. LUE tremor unchanged from baseline. MRI pending.

## 2025-06-08 NOTE — H&P ADULT - PROBLEM SELECTOR PLAN 2
Patient unable to provide accurate detailed home medication list , family unavailable at time of interview.   - pharm tech emailed to update and verify home medications , day team to reconciled once completed  - continue metoprolol   - day team to verify  losartan dose and resume once confirmed

## 2025-06-08 NOTE — H&P ADULT - PROBLEM SELECTOR PLAN 1
concerning for embolic stroke , out of window for treatment   - opthalmology consult appreciated, day team to follow up further recommendations   - Neurology consult appreciated, day team to follow up further recommendations   -MRI brain without contrast   -TTE w/ bubble   -telemetry   -Implantable loop recorder  - to be arranged by day team   - f/u Lipid panel, HbA1c  - c/w asa   - Atorvastatin 80mg   - Dysphagia screen and SLP eval   - NPO until passes dysphagia screen   - BP goal normotensive   - PT/OT

## 2025-06-08 NOTE — CONSULT NOTE ADULT - ASSESSMENT
Patient is a 79-year-old male past medical history of diabetes, hypertension, hyperlipidemia, CAD presents for  left eye blurriness  which started on day prior to admission.   Patient reports blurry vision in his left eye  for the past two days. He was evaluated by ophthalmology   and fundoscopy and angiography of the eye which showed signs of CRAO.  He was subsequently advised to come to the hospital. He reports focal motor weakness or numbness , no lightheadedness , no slurred speech.   pt with sig cardiac hx with multiple stents with L eye blindness   CTA noted  asa daily ?role of adding Plavix  echo  will get cardiac morle from Dr Azevedo  lipitor 80 mg qhs  r/o PAF

## 2025-06-08 NOTE — SPEECH LANGUAGE PATHOLOGY EVALUATION - COMMENTS
Imagin/07: CTH: Age-appropriate involutional changes. Atherosclerotic changes. No acute intracranial hemorrhage, mass effect, or midline shift. Consider further evaluation via dedicated ophthalmologic assessment. CTA NECK: No evidence of significant stenosis or occlusion. CTA HEAD: 1. No large vessel occlusion. 2. Deposition of calcified plaque in association with the cavernous and supraclinoid portions of bilateral internal carotid arteries with mild to moderate stenosis bilaterally in these locations. 3. No aneurysm identified. Tiny aneurysms can be beyond the resolution of CTA technique.    Passed dysphagia screen  @06:17, on a regular diet.    **Not previously known to this service. Maintain good oral care.    Results d/w pt and ACP Jami Estrada. Performance during picture description scene revealed intact description of both basic and abstract concepts in picture. Pt states that writing appears at baseline. Clock drawing task revealed intact judgment/planning and organization. Subtest 6: Understanding Medicine Labels of Assessment of Language-Related Functional Activities (ROSA) performed. The patient scored 10 out of 10. This indicates a high probability of independent functioning on this task.

## 2025-06-08 NOTE — OCCUPATIONAL THERAPY INITIAL EVALUATION ADULT - IADL RETRAINING, OT EVAL
Pt will read prescription labels (I), using compensatory strategies as needed, within 2 weeks. Pt will read recipe on cell phone, using compensatory strategies as needed, within 2 weeks.

## 2025-06-08 NOTE — H&P ADULT - HISTORY OF PRESENT ILLNESS
Patient is a 79-year-old male past medical history of diabetes, hypertension, hyperlipidemia, CAD presents for  left eye blurriness  which started on day prior to admission.   Patient reports blurry vision in his left eye  for the past two days. He was evaluated by ophthalmology   and fundoscopy and angiography of the eye which showed signs of CRAO.  He was subsequently advised to come to the hospital. He reports focal motor weakness or numbness , no lightheadedness , no slurred speech.

## 2025-06-08 NOTE — SPEECH LANGUAGE PATHOLOGY EVALUATION - SLP GENERAL OBSERVATIONS
Pt encountered sitting upright OOBTC, awake, alert, oriented x4, on RA. Speech intelligible; able to follow commands and make wants/needs known for purposes of evaluation.

## 2025-06-08 NOTE — H&P ADULT - NSHPLABSRESULTS_GEN_ALL_CORE
Labs personally reviewed:                          13.8   7.72  )-----------( 182      ( 07 Jun 2025 16:42 )             42.5     06-07    140  |  102  |  22  ----------------------------<  95  4.0   |  23  |  0.97    Ca    9.9      07 Jun 2025 16:42    TPro  7.3  /  Alb  4.4  /  TBili  0.6  /  DBili  x   /  AST  21  /  ALT  16  /  AlkPhos  59  06-07        LIVER FUNCTIONS - ( 07 Jun 2025 16:42 )  Alb: 4.4 g/dL / Pro: 7.3 g/dL / ALK PHOS: 59 U/L / ALT: 16 U/L / AST: 21 U/L / GGT: x           PT/INR - ( 07 Jun 2025 16:42 )   PT: 12.1 sec;   INR: 1.05 ratio         PTT - ( 07 Jun 2025 16:42 )  PTT:30.4 sec  Urinalysis Basic - ( 07 Jun 2025 16:42 )    Color: x / Appearance: x / SG: x / pH: x  Gluc: 95 mg/dL / Ketone: x  / Bili: x / Urobili: x   Blood: x / Protein: x / Nitrite: x   Leuk Esterase: x / RBC: x / WBC x   Sq Epi: x / Non Sq Epi: x / Bacteria: x      CAPILLARY BLOOD GLUCOSE      POCT Blood Glucose.: 98 mg/dL (07 Jun 2025 16:49)      Imaging:  CT HEAD: Age-appropriate involutional changes. Atherosclerotic changes.  No acute intracranial hemorrhage, mass effect, or midline shift. Consider   further evaluation via dedicated ophthalmologic assessment.    CTA NECK:  No evidence of significant stenosis or occlusion.    CTA HEAD:  1. No large vessel occlusion.  2. Deposition of calcified plaque in association with the cavernous and   supraclinoid portions of bilateral internal carotid arteries with mild to   moderate stenosis bilaterally in these locations.  3 .No aneurysm identified. Tiny aneurysms can be beyond the resolution of   CTA technique.      EKG personally reviewed: nsr at 61 bpm , no acute st changes

## 2025-06-08 NOTE — H&P ADULT - ASSESSMENT
79M w/pmh diabetes, hypertension, hyperlipidemia, CAD p/w   left eye blurriness x 2 days , opthal exam c/w VICTOR HUGO

## 2025-06-08 NOTE — PHYSICAL THERAPY INITIAL EVALUATION ADULT - DIAGNOSIS, PT EVAL
patient independent Patient requests all Lab, Cardiology, and Radiology Results on their Discharge Instructions

## 2025-06-09 ENCOUNTER — RESULT REVIEW (OUTPATIENT)
Age: 80
End: 2025-06-09

## 2025-06-09 PROCEDURE — 93306 TTE W/DOPPLER COMPLETE: CPT | Mod: 26

## 2025-06-09 RX ORDER — CLOPIDOGREL BISULFATE 75 MG/1
75 TABLET, FILM COATED ORAL ONCE
Refills: 0 | Status: DISCONTINUED | OUTPATIENT
Start: 2025-06-09 | End: 2025-06-10

## 2025-06-09 RX ADMIN — Medication 81 MILLIGRAM(S): at 12:02

## 2025-06-09 RX ADMIN — METOPROLOL SUCCINATE 50 MILLIGRAM(S): 50 TABLET, EXTENDED RELEASE ORAL at 06:08

## 2025-06-09 RX ADMIN — ENOXAPARIN SODIUM 40 MILLIGRAM(S): 100 INJECTION SUBCUTANEOUS at 06:07

## 2025-06-09 RX ADMIN — ATORVASTATIN CALCIUM 80 MILLIGRAM(S): 80 TABLET, FILM COATED ORAL at 22:09

## 2025-06-09 NOTE — PROGRESS NOTE ADULT - ASSESSMENT
79-year-old male presenting to [facility] ED 06/07/2025 for left eye blurriness which started day PTA at 9 AM.  Patient states his vision of the left eye has been blurry since. Was evaluated by Dr. Ava Stubbs who recommended patient be sent in for BRVO rule out.  Ophtho evaluation consistent w CRAO  vitals: 122/80  NIHSS: 2 (LUE drift and L face)  NOT tenecteplase or mechanical thrombectomy candidate dt oow  prior ELBA scan neg (ordered by Joshua)   CTH neg   CTA H/N with intracranial ICA stensois noted. 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 no acute infarcts findings. moderate MVD   LDL 57  ESR 18  A1c 5.7   CRP < 3     IMPRESSION:  CRAO w LUE and L face weakness. Localization possible to R brain dysfunction. Patient LEFT vasculature with great calcific stenosis burden than right but right anterior circulation w mild to mod stenosis. Etiology and mechanism of weakness pending further work up  LUE tremor unchanged from baseline.  ICAD  ICA stesnoiss B/L   pineal cyst     Recommendations:     - f/u optho  - check CD  - Dual antiplatelet therapy with ASA 81mg PO daily and Clopidogrel 75mg PO daily x 3 weeks followed by monotherapy with ASA 81mg PO daily.  - High dose statin therapy - atorvastatin 40mg PO daily. LDL goal <70mg/dL.  - TTE  - will need extended cardiac montioring (ie ILR)  - telemetry  - PT/OT/SS/SLP, OOBC  - normotension   - check FS, glucose control <180  - GI/DVT ppx   - Thank you for allowing me to participate in the care of this patient. Call with questions.   Jony Lyons MD  Vascular Neurology  Office: [phone] .

## 2025-06-09 NOTE — PROGRESS NOTE ADULT - ASSESSMENT
79-yr  m     presenting to  er,  06/07/2025 for left eye blurriness  for  a  day   / seen by  optho  by Dr. Ava Stubbs who recommended patient be sent in for BRVO rule out.    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

## 2025-06-10 ENCOUNTER — TRANSCRIPTION ENCOUNTER (OUTPATIENT)
Age: 80
End: 2025-06-10

## 2025-06-10 LAB
ANION GAP SERPL CALC-SCNC: 11 MMOL/L — SIGNIFICANT CHANGE UP (ref 5–17)
BUN SERPL-MCNC: 22 MG/DL — SIGNIFICANT CHANGE UP (ref 7–23)
CALCIUM SERPL-MCNC: 9.4 MG/DL — SIGNIFICANT CHANGE UP (ref 8.4–10.5)
CHLORIDE SERPL-SCNC: 107 MMOL/L — SIGNIFICANT CHANGE UP (ref 96–108)
CO2 SERPL-SCNC: 22 MMOL/L — SIGNIFICANT CHANGE UP (ref 22–31)
CREAT SERPL-MCNC: 0.84 MG/DL — SIGNIFICANT CHANGE UP (ref 0.5–1.3)
EGFR: 90 ML/MIN/1.73M2 — SIGNIFICANT CHANGE UP
EGFR: 90 ML/MIN/1.73M2 — SIGNIFICANT CHANGE UP
GLUCOSE SERPL-MCNC: 84 MG/DL — SIGNIFICANT CHANGE UP (ref 70–99)
HCT VFR BLD CALC: 38.1 % — LOW (ref 39–50)
HGB BLD-MCNC: 12.8 G/DL — LOW (ref 13–17)
MCHC RBC-ENTMCNC: 31 PG — SIGNIFICANT CHANGE UP (ref 27–34)
MCHC RBC-ENTMCNC: 33.6 G/DL — SIGNIFICANT CHANGE UP (ref 32–36)
MCV RBC AUTO: 92.3 FL — SIGNIFICANT CHANGE UP (ref 80–100)
NRBC BLD AUTO-RTO: 0 /100 WBCS — SIGNIFICANT CHANGE UP (ref 0–0)
PLATELET # BLD AUTO: 168 K/UL — SIGNIFICANT CHANGE UP (ref 150–400)
POTASSIUM SERPL-MCNC: 3.6 MMOL/L — SIGNIFICANT CHANGE UP (ref 3.5–5.3)
POTASSIUM SERPL-SCNC: 3.6 MMOL/L — SIGNIFICANT CHANGE UP (ref 3.5–5.3)
RBC # BLD: 4.13 M/UL — LOW (ref 4.2–5.8)
RBC # FLD: 13.3 % — SIGNIFICANT CHANGE UP (ref 10.3–14.5)
SODIUM SERPL-SCNC: 140 MMOL/L — SIGNIFICANT CHANGE UP (ref 135–145)
WBC # BLD: 5.52 K/UL — SIGNIFICANT CHANGE UP (ref 3.8–10.5)
WBC # FLD AUTO: 5.52 K/UL — SIGNIFICANT CHANGE UP (ref 3.8–10.5)

## 2025-06-10 PROCEDURE — 70498 CT ANGIOGRAPHY NECK: CPT

## 2025-06-10 PROCEDURE — 85025 COMPLETE CBC W/AUTO DIFF WBC: CPT

## 2025-06-10 PROCEDURE — 83036 HEMOGLOBIN GLYCOSYLATED A1C: CPT

## 2025-06-10 PROCEDURE — 97166 OT EVAL MOD COMPLEX 45 MIN: CPT

## 2025-06-10 PROCEDURE — 99285 EMERGENCY DEPT VISIT HI MDM: CPT | Mod: 25

## 2025-06-10 PROCEDURE — 85027 COMPLETE CBC AUTOMATED: CPT

## 2025-06-10 PROCEDURE — 70496 CT ANGIOGRAPHY HEAD: CPT

## 2025-06-10 PROCEDURE — 80053 COMPREHEN METABOLIC PANEL: CPT

## 2025-06-10 PROCEDURE — 93306 TTE W/DOPPLER COMPLETE: CPT

## 2025-06-10 PROCEDURE — 36415 COLL VENOUS BLD VENIPUNCTURE: CPT

## 2025-06-10 PROCEDURE — 70551 MRI BRAIN STEM W/O DYE: CPT

## 2025-06-10 PROCEDURE — 85730 THROMBOPLASTIN TIME PARTIAL: CPT

## 2025-06-10 PROCEDURE — 80061 LIPID PANEL: CPT

## 2025-06-10 PROCEDURE — 82962 GLUCOSE BLOOD TEST: CPT

## 2025-06-10 PROCEDURE — 86140 C-REACTIVE PROTEIN: CPT

## 2025-06-10 PROCEDURE — 70450 CT HEAD/BRAIN W/O DYE: CPT

## 2025-06-10 PROCEDURE — 84484 ASSAY OF TROPONIN QUANT: CPT

## 2025-06-10 PROCEDURE — 85652 RBC SED RATE AUTOMATED: CPT

## 2025-06-10 PROCEDURE — 97161 PT EVAL LOW COMPLEX 20 MIN: CPT

## 2025-06-10 PROCEDURE — 92523 SPEECH SOUND LANG COMPREHEN: CPT

## 2025-06-10 PROCEDURE — 80048 BASIC METABOLIC PNL TOTAL CA: CPT

## 2025-06-10 PROCEDURE — 85610 PROTHROMBIN TIME: CPT

## 2025-06-10 PROCEDURE — 93005 ELECTROCARDIOGRAM TRACING: CPT

## 2025-06-10 RX ORDER — METOPROLOL SUCCINATE 50 MG/1
1 TABLET, EXTENDED RELEASE ORAL
Refills: 0 | DISCHARGE

## 2025-06-10 RX ORDER — LOSARTAN POTASSIUM AND HYDROCHLOROTHIAZIDE 12.5; 5 MG/1; MG/1
1 TABLET ORAL
Refills: 0 | DISCHARGE

## 2025-06-10 RX ORDER — ROSUVASTATIN CALCIUM 20 MG/1
1 TABLET, FILM COATED ORAL
Refills: 0 | DISCHARGE

## 2025-06-10 RX ORDER — CLOPIDOGREL BISULFATE 75 MG/1
1 TABLET, FILM COATED ORAL
Qty: 30 | Refills: 0
Start: 2025-06-10 | End: 2025-07-09

## 2025-06-10 RX ORDER — CLOPIDOGREL BISULFATE 75 MG/1
75 TABLET, FILM COATED ORAL DAILY
Refills: 0 | Status: DISCONTINUED | OUTPATIENT
Start: 2025-06-10 | End: 2025-06-10

## 2025-06-10 RX ORDER — METOPROLOL SUCCINATE 50 MG/1
1 TABLET, EXTENDED RELEASE ORAL
Qty: 30 | Refills: 0
Start: 2025-06-10 | End: 2025-07-09

## 2025-06-10 RX ORDER — ATORVASTATIN CALCIUM 80 MG/1
1 TABLET, FILM COATED ORAL
Qty: 30 | Refills: 0
Start: 2025-06-10 | End: 2025-07-09

## 2025-06-10 RX ADMIN — CLOPIDOGREL BISULFATE 75 MILLIGRAM(S): 75 TABLET, FILM COATED ORAL at 11:06

## 2025-06-10 RX ADMIN — Medication 81 MILLIGRAM(S): at 11:06

## 2025-06-10 RX ADMIN — METOPROLOL SUCCINATE 50 MILLIGRAM(S): 50 TABLET, EXTENDED RELEASE ORAL at 05:26

## 2025-06-10 RX ADMIN — ENOXAPARIN SODIUM 40 MILLIGRAM(S): 100 INJECTION SUBCUTANEOUS at 05:26

## 2025-06-10 NOTE — PROGRESS NOTE ADULT - ASSESSMENT
79-year-old male presenting to [facility] ED 06/07/2025 for left eye blurriness which started day PTA at 9 AM.  Patient states his vision of the left eye has been blurry since. Was evaluated by Dr. Ava Stubbs who recommended patient be sent in for BRVO rule out.  Ophtho evaluation consistent w CRAO  vitals: 122/80  NIHSS: 2 (LUE drift and L face)  NOT tenecteplase or mechanical thrombectomy candidate dt oow  prior ELBA scan neg (ordered by Joshua)   CTH neg   CTA H/N with intracranial ICA stensois noted. 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 no acute infarcts findings. moderate MVD   LDL 57  ESR 18  A1c 5.7   CRP < 3   TTE 6/9 EF 64%     IMPRESSION:  CRAO w LUE and L face weakness. Localization possible to R brain dysfunction. Patient LEFT vasculature with great calcific stenosis burden than right but right anterior circulation w mild to mod stenosis. Etiology and mechanism of weakness pending further work up  LUE tremor unchanged from baseline.  ICAD  ICA stesnoiss B/L   pineal cyst     Recommendations:     - f/u optho  - check CD in or outpatinet   - Dual antiplatelet therapy with ASA 81mg PO daily and Clopidogrel 75mg PO daily x 3 weeks followed by monotherapy with ASA 81mg PO daily.  - High dose statin therapy - atorvastatin 40mg PO daily. LDL goal <70mg/dL.  - will need extended cardiac montioring (ie ILR)  - telemetry  - PT/OT/SS/SLP, OOBC  - normotension   - check FS, glucose control <180  - GI/DVT ppx   - Thank you for allowing me to participate in the care of this patient. Call with questions.   Jony Lyons MD  Vascular Neurology  Office: [phone] .

## 2025-06-10 NOTE — DISCHARGE NOTE PROVIDER - CARE PROVIDER_API CALL
Kulwinder Azevedo  Cardiology  [address] [place] [address], [address]  [place] [address]  Phone: [phone]  Fax: [phone]  Follow Up Time:    Kulwinder Azevedo  Cardiology  [address] [facility] [facility], [address]  [place] [address]  Phone: [phone]  Fax: [phone]  Follow Up Time:     Jony Lyons  Neurology  [address] [facility] [address] [facility] [address], [address]  [place] [address]  Phone: [phone]  Fax: [phone]  Follow Up Time: 1 week   Unruly Cheung)  Ophthalmology  [address], [address]  [place] [address]  Phone: [phone]  Fax: [phone]  Scheduled Appointment: 07/24/2025

## 2025-06-10 NOTE — DISCHARGE NOTE PROVIDER - NSDCCPCAREPLAN_GEN_ALL_CORE_FT
PRINCIPAL DISCHARGE DIAGNOSIS  Diagnosis: Vision changes  Assessment and Plan of Treatment: ASA and Statin; optho and neuro follow up      SECONDARY DISCHARGE DIAGNOSES  Diagnosis: HTN (hypertension)  Assessment and Plan of Treatment:     Diagnosis: CRAO (central retinal artery occlusion), left  Assessment and Plan of Treatment:      PRINCIPAL DISCHARGE DIAGNOSIS  Diagnosis: Vision changes  Assessment and Plan of Treatment: ASA, Plavix and Statin; optho and neuro follow up      SECONDARY DISCHARGE DIAGNOSES  Diagnosis: CRAO (central retinal artery occlusion), left  Assessment and Plan of Treatment: Yoi    Diagnosis: HTN (hypertension)  Assessment and Plan of Treatment:      PRINCIPAL DISCHARGE DIAGNOSIS  Diagnosis: Vision changes  Assessment and Plan of Treatment: ASA, Plavix and Statin; optho and neuro follow up      SECONDARY DISCHARGE DIAGNOSES  Diagnosis: CRAO (central retinal artery occlusion), left  Assessment and Plan of Treatment: You were diagnosed with central retinal artery occlusion; continue taking ASA, Plavix, and Lipitor at home, follow up with neurology and PCP in 1 week   __ follow up with your cardiology Dr Azevedo for ILR placement    Diagnosis: Internal carotid artery stenosis  Assessment and Plan of Treatment: you were found to have mild-mod carotid artery stenosis, No intervention is indicated at the present time.  Follow up with PCP in 1 -2 weeks.    Diagnosis: HTN (hypertension)  Assessment and Plan of Treatment: Take medications for your blood pressure as recommended.  Eat a heart healthy diet that is low in saturated fats and salt, and includes whole grains, fruits, vegetables and lean protein   Exercise regularly (consult with your physician or cardiologist first); maintain a heart healthy weight.   If you smoke - quit (A resource to help you stop smoking is the [facility] – phone number [phone].). Continue to follow with your primary physician or cardiologist.   Seek medical help for dizziness, Lightheadedness, Blurry vision, Headache, Chest pain, Shortness of breath  Follow up with your medical doctor to establish long term blood pressure treatment goals.  take meds as prescribed follow up with PCP in 1 week    Diagnosis: CAD (coronary artery disease)  Assessment and Plan of Treatment: Take meds as prescribed and follow up with PCP in 1 week    Diagnosis: Diabetes type 2  Assessment and Plan of Treatment: take meds as prescribed and follow up with PCP in 1 week    Diagnosis: Hyperlipidemia  Assessment and Plan of Treatment: take meds as prescribed follow up with PCP in 1 week

## 2025-06-10 NOTE — PROGRESS NOTE ADULT - SUBJECTIVE AND OBJECTIVE BOX
date of service: 06-09-25 @ 07:59  [facility]  REVIEW OF SYSTEMS:  CONSTITUTIONAL: No fever,  no  weight loss  ENT:  No  tinnitus,   no   vertigo  NECK: No pain or stiffness  RESPIRATORY: No cough, wheezing, chills or hemoptysis;    No Shortness of Breath  CARDIOVASCULAR: No chest pain, palpitations, dizziness  GASTROINTESTINAL: No abdominal or epigastric pain. No nausea, vomiting, or hematemesis; No diarrhea  No melena or hematochezia.  GENITOURINARY: No dysuria, frequency, hematuria, or incontinence  NEUROLOGICAL: No headaches  SKIN: No itching,  no   rash  LYMPH Nodes: No enlarged glands  ENDOCRINE: No heat or cold intolerance  MUSCULOSKELETAL: No joint pain or swelling  PSYCHIATRIC: No depression, anxiety  HEME/LYMPH: No easy bruising, or bleeding gums  ALLERGY AND IMMUNOLOGIC: No hives or eczema	    MEDICATIONS  (STANDING):  aspirin enteric coated 81 milliGRAM(s) Oral daily  atorvastatin 80 milliGRAM(s) Oral at bedtime  enoxaparin Injectable 40 milliGRAM(s) SubCutaneous every 24 hours  metoprolol succinate ER 50 milliGRAM(s) Oral daily    MEDICATIONS  (PRN):  acetaminophen     Tablet .. 650 milliGRAM(s) Oral every 6 hours PRN Temp greater or equal to 38C (100.4F), Mild Pain (1 - 3)      Vital Signs Last 24 Hrs  T(C): 36.6 (09 Jun 2025 04:58), Max: 36.6 (09 Jun 2025 04:58)  T(F): 97.8 (09 Jun 2025 04:58), Max: 97.8 (09 Jun 2025 04:58)  HR: 61 (09 Jun 2025 04:58) (61 - 83)  BP: 115/61 (09 Jun 2025 04:58) (106/61 - 134/77)  BP(mean): --  RR: 18 (09 Jun 2025 04:58) (18 - 18)  SpO2: 93% (09 Jun 2025 04:58) (93% - 94%)    Parameters below as of 09 Jun 2025 04:58  Patient On (Oxygen Delivery Method): room air      CAPILLARY BLOOD GLUCOSE        I&O's Summary        Appearance: Normal	  HEENT:   Normal oral mucosa, PERRL, EOMI	  Lymphatic: No lymphadenopathy  Cardiovascular: Normal S1 S2, No JVD  Respiratory: Lungs clear to auscultation	  Gastrointestinal:  Soft, Non-tender, + BS	  Skin: No rash, No ecchymoses	  Extremities:     LABS:                        13.8   7.72  )-----------( 182      ( 07 Jun 2025 16:42 )             42.5     06-08    140  |  105  |  18  ----------------------------<  77  3.6   |  21[L]  |  0.78    Ca    9.7      08 Jun 2025 07:03    TPro  7.3  /  Alb  4.4  /  TBili  0.6  /  DBili  x   /  AST  21  /  ALT  16  /  AlkPhos  59  06-07    PT/INR - ( 07 Jun 2025 16:42 )   PT: 12.1 sec;   INR: 1.05 ratio         PTT - ( 07 Jun 2025 16:42 )  PTT:30.4 sec      Urinalysis Basic - ( 08 Jun 2025 07:03 )    Color: x / Appearance: x / SG: x / pH: x  Gluc: 77 mg/dL / Ketone: x  / Bili: x / Urobili: x   Blood: x / Protein: x / Nitrite: x   Leuk Esterase: x / RBC: x / WBC x   Sq Epi: x / Non Sq Epi: x / Bacteria: x                      Consultant(s) Notes Reviewed:      Care Discussed with Consultants/Other Providers:    
  date of service: 06-10-25 @ 08:26  [facility]  REVIEW OF SYSTEMS:  CONSTITUTIONAL: No fever,  no  weight loss  ENT:  No  tinnitus,   no   vertigo  NECK: No pain or stiffness  RESPIRATORY: No cough, wheezing, chills or hemoptysis;    No Shortness of Breath  CARDIOVASCULAR: No chest pain, palpitations, dizziness  GASTROINTESTINAL: No abdominal or epigastric pain. No nausea, vomiting, or hematemesis; No diarrhea  No melena or hematochezia.  GENITOURINARY: No dysuria, frequency, hematuria, or incontinence  NEUROLOGICAL: No headaches  SKIN: No itching,  no   rash  LYMPH Nodes: No enlarged glands  ENDOCRINE: No heat or cold intolerance  MUSCULOSKELETAL: No joint pain or swelling  PSYCHIATRIC: No depression, anxiety  HEME/LYMPH: No easy bruising, or bleeding gums  ALLERGY AND IMMUNOLOGIC: No hives or eczema	    MEDICATIONS  (STANDING):  aspirin enteric coated 81 milliGRAM(s) Oral daily  atorvastatin 80 milliGRAM(s) Oral at bedtime  clopidogrel Tablet 75 milliGRAM(s) Oral once  enoxaparin Injectable 40 milliGRAM(s) SubCutaneous every 24 hours  metoprolol succinate ER 50 milliGRAM(s) Oral daily    MEDICATIONS  (PRN):  acetaminophen     Tablet .. 650 milliGRAM(s) Oral every 6 hours PRN Temp greater or equal to 38C (100.4F), Mild Pain (1 - 3)      Vital Signs Last 24 Hrs  T(C): 36.4 (10 Mathew 2025 05:00), Max: 36.6 (09 Jun 2025 20:30)  T(F): 97.5 (10 Mathew 2025 05:00), Max: 97.8 (09 Jun 2025 20:30)  HR: 62 (10 Mathew 2025 05:00) (60 - 67)  BP: 134/79 (10 Mathew 2025 05:00) (116/75 - 138/69)  BP(mean): --  RR: 18 (10 Mathew 2025 05:00) (18 - 18)  SpO2: 94% (10 Mathew 2025 05:00) (94% - 95%)    Parameters below as of 10 Mathew 2025 05:00  Patient On (Oxygen Delivery Method): room air      CAPILLARY BLOOD GLUCOSE        I&O's Summary        Appearance: Normal	  HEENT:   Normal oral mucosa, PERRL, EOMI	  Lymphatic: No lymphadenopathy  Cardiovascular: Normal S1 S2, No JVD  Respiratory: Lungs clear to auscultation	  Gastrointestinal:  Soft, Non-tender, + BS	  Skin: No rash, No ecchymoses	  Extremities:     LABS:                        12.8   5.52  )-----------( 168      ( 10 Mathew 2025 05:41 )             38.1     06-10    140  |  107  |  22  ----------------------------<  84  3.6   |  22  |  0.84    Ca    9.4      10 Mathew 2025 05:41            Urinalysis Basic - ( 10 Mathew 2025 05:41 )    Color: x / Appearance: x / SG: x / pH: x  Gluc: 84 mg/dL / Ketone: x  / Bili: x / Urobili: x   Blood: x / Protein: x / Nitrite: x   Leuk Esterase: x / RBC: x / WBC x   Sq Epi: x / Non Sq Epi: x / Bacteria: x                      Consultant(s) Notes Reviewed:      Care Discussed with Consultants/Other Providers:    
Date of Service: 06-09-25 @ 08:43           CARDIOLOGY     PROGRESS  NOTE   ________________________________________________  CHIEF COMPLAINT:Patient is a 79y old  Male who presents with a chief complaint of CRA occlusion of left eye (09 Jun 2025 07:58)  no complain  	  REVIEW OF SYSTEMS:  CONSTITUTIONAL: No fever, weight loss, or fatigue  EYES: No eye pain, visual disturbances, or discharge  ENT:  No difficulty hearing, tinnitus, vertigo; No sinus or throat pain  NECK: No pain or stiffness  RESPIRATORY: No cough, wheezing, chills or hemoptysis; No Shortness of Breath  CARDIOVASCULAR: No chest pain, palpitations, passing out, dizziness, or leg swelling  GASTROINTESTINAL: No abdominal or epigastric pain. No nausea, vomiting, or hematemesis; No diarrhea or constipation. No melena or hematochezia.  GENITOURINARY: No dysuria, frequency, hematuria, or incontinence  NEUROLOGICAL: No headaches, memory loss, loss of strength, numbness, or tremors  SKIN: No itching, burning, rashes, or lesions   LYMPH Nodes: No enlarged glands  ENDOCRINE: No heat or cold intolerance; No hair loss  MUSCULOSKELETAL: No joint pain or swelling; No muscle, back, or extremity pain  PSYCHIATRIC: No depression, anxiety, mood swings, or difficulty sleeping  HEME/LYMPH: No easy bruising, or bleeding gums  ALLERGY AND IMMUNOLOGIC: No hives or eczema	    [x ] All others negative	  [ ] Unable to obtain    PHYSICAL EXAM:  T(C): 36.6 (06-09-25 @ 04:58), Max: 36.6 (06-09-25 @ 04:58)  HR: 61 (06-09-25 @ 04:58) (61 - 83)  BP: 115/61 (06-09-25 @ 04:58) (106/61 - 134/77)  RR: 18 (06-09-25 @ 04:58) (18 - 18)  SpO2: 93% (06-09-25 @ 04:58) (93% - 94%)  Wt(kg): --  I&O's Summary      Appearance: Normal	  HEENT:   Normal oral mucosa, PERRL, EOMI	  Lymphatic: No lymphadenopathy  Cardiovascular: Normal S1 S2, No JVD, + murmurs, No edema  Respiratory: Lungs clear to auscultation	  Psychiatry: A & O x 3, Mood & affect appropriate  Gastrointestinal:  Soft, Non-tender, + BS	  Skin: No rashes, No ecchymoses, No cyanosis	  Neurologic: Non-focal/ tremors  Extremities: Normal range of motion, No clubbing, cyanosis or edema  Vascular: Peripheral pulses palpable 2+ bilaterally    MEDICATIONS  (STANDING):  aspirin enteric coated 81 milliGRAM(s) Oral daily  atorvastatin 80 milliGRAM(s) Oral at bedtime  enoxaparin Injectable 40 milliGRAM(s) SubCutaneous every 24 hours  metoprolol succinate ER 50 milliGRAM(s) Oral daily      TELEMETRY: 	    ECG:  	  RADIOLOGY:  OTHER: 	  	  LABS:	 	    CARDIAC MARKERS:                            13.8   7.72  )-----------( 182      ( 07 Jun 2025 16:42 )             42.5     06-08    140  |  105  |  18  ----------------------------<  77  3.6   |  21[L]  |  0.78    Ca    9.7      08 Jun 2025 07:03    TPro  7.3  /  Alb  4.4  /  TBili  0.6  /  DBili  x   /  AST  21  /  ALT  16  /  AlkPhos  59  06-07    proBNP:   Lipid Profile: Cholesterol 117  LDL --  HDL 36      HgA1c:   TSH:   PT/INR - ( 07 Jun 2025 16:42 )   PT: 12.1 sec;   INR: 1.05 ratio       PTT - ( 07 Jun 2025 16:42 )  PTT:30.4 sec    # 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 Cheung for stroke workup  - Recommend full stroke workup, including CT head, CTA Head/Neck, TTE, etc.  - Recommend neurology consult  - Given patient's age, recommend ESR and CRP to evaluate for GCA  - Pt to follow up with Dr. Unruly Cheung (retina specialist, [facility]) at scheduled appointment on 7/24/25 at 8:00am  - 6/8 repeated DFE due to reported symptom of new flashes in the left eye which are now resolved. Fundus findings unchanged from previous with no holes/tears.     < from: MR Head No Cont (06.08.25 @ 22:01) >  6 mm pineal gland cyst.  There is no abnormal restricted diffusion to suggest acute infarction.   Scattered periventricular and subcortical white matter T2 /FLAIR   hyperintensities are seen without mass effect, nonspecific, likely   representing moderate chronic microvascular changes. Normal T2 flow-voids   are seen within  the intracranial vasculature. The lateral ventricles and   cortical sulci are age-appropriate in size and configuration. There is no   mass, mass effect, or extra-axial fluid collection. There is no   susceptibility artifact to suggest hemorrhage. Midline structures are   normal.  The visualized paranasal sinuses, mastoid air cells and orbits   are unremarkable.      IMPRESSION: No acute infarction.    Sedimentation Rate, Erythrocyte (06.08.25 @ 07:04)    Sedimentation Rate, Erythrocyte: 18 mm/hr    C-Reactive Protein (06.08.25 @ 07:03)    C-Reactive Protein: <3 mg/L      Assessment and plan  ---------------------------  Patient is a 79-year-old male past medical history of diabetes, hypertension, hyperlipidemia, CAD presents for  left eye blurriness  which started on day prior to admission.   Patient reports blurry vision in his left eye  for the past two days. He was evaluated by ophthalmology   and fundoscopy and angiography of the eye which showed signs of CRAO.  He was subsequently advised to come to the hospital. He reports focal motor weakness or numbness , no lightheadedness , no slurred speech.   pt with sig cardiac hx with multiple stents with L eye blindness   CTA noted  asa daily ?role of adding Plavix  echo awaiting  will get cardiac morel from Dr Azevedo  lipitor 80 mg qhs  r/o PAF, tele no a.fib  neuro follow up/ tremors   check ESR / CRP  MR head noted  	        
  date of service: 06-08-25 @ 14:19  [facility]  REVIEW OF SYSTEMS:  CONSTITUTIONAL: No fever,  no  weight loss  ENT:  No  tinnitus,   no   vertigo  NECK: No pain or stiffness  RESPIRATORY: No cough, wheezing, chills or hemoptysis;    No Shortness of Breath  CARDIOVASCULAR: No chest pain, palpitations, dizziness  GASTROINTESTINAL: No abdominal or epigastric pain. No nausea, vomiting, or hematemesis; No diarrhea  No melena or hematochezia.  GENITOURINARY: No dysuria, frequency, hematuria, or incontinence  NEUROLOGICAL: No headaches  SKIN: No itching,  no   rash  LYMPH Nodes: No enlarged glands  ENDOCRINE: No heat or cold intolerance  MUSCULOSKELETAL: No joint pain or swelling  PSYCHIATRIC: No depression, anxiety  HEME/LYMPH: No easy bruising, or bleeding gums  ALLERGY AND IMMUNOLOGIC: No hives or eczema	    MEDICATIONS  (STANDING):  aspirin enteric coated 81 milliGRAM(s) Oral daily  atorvastatin 80 milliGRAM(s) Oral at bedtime  enoxaparin Injectable 40 milliGRAM(s) SubCutaneous every 24 hours  metoprolol succinate  milliGRAM(s) Oral daily    MEDICATIONS  (PRN):  acetaminophen     Tablet .. 650 milliGRAM(s) Oral every 6 hours PRN Temp greater or equal to 38C (100.4F), Mild Pain (1 - 3)      Vital Signs Last 24 Hrs  T(C): 36.5 (08 Jun 2025 11:53), Max: 36.7 (07 Jun 2025 23:10)  T(F): 97.7 (08 Jun 2025 11:53), Max: 98 (07 Jun 2025 23:10)  HR: 70 (08 Jun 2025 11:53) (56 - 83)  BP: 106/61 (08 Jun 2025 11:53) (106/61 - 151/81)  BP(mean): 92 (07 Jun 2025 19:04) (92 - 92)  RR: 18 (08 Jun 2025 11:53) (17 - 20)  SpO2: 93% (08 Jun 2025 11:53) (92% - 98%)    Parameters below as of 08 Jun 2025 11:53  Patient On (Oxygen Delivery Method): room air      CAPILLARY BLOOD GLUCOSE      POCT Blood Glucose.: 98 mg/dL (07 Jun 2025 16:49)    I&O's Summary        Appearance: Normal	  HEENT:   Normal oral mucosa, PERRL, EOMI	  Lymphatic: No lymphadenopathy  Cardiovascular: Normal S1 S2, No JVD  Respiratory: Lungs clear to auscultation	  Gastrointestinal:  Soft, Non-tender, + BS	  Skin: No rash, No ecchymoses	  Extremities:     LABS:                        13.8   7.72  )-----------( 182      ( 07 Jun 2025 16:42 )             42.5     06-08    140  |  105  |  18  ----------------------------<  77  3.6   |  21[L]  |  0.78    Ca    9.7      08 Jun 2025 07:03    TPro  7.3  /  Alb  4.4  /  TBili  0.6  /  DBili  x   /  AST  21  /  ALT  16  /  AlkPhos  59  06-07    PT/INR - ( 07 Jun 2025 16:42 )   PT: 12.1 sec;   INR: 1.05 ratio         PTT - ( 07 Jun 2025 16:42 )  PTT:30.4 sec      Urinalysis Basic - ( 08 Jun 2025 07:03 )    Color: x / Appearance: x / SG: x / pH: x  Gluc: 77 mg/dL / Ketone: x  / Bili: x / Urobili: x   Blood: x / Protein: x / Nitrite: x   Leuk Esterase: x / RBC: x / WBC x   Sq Epi: x / Non Sq Epi: x / Bacteria: x                      Consultant(s) Notes Reviewed:      Care Discussed with Consultants/Other Providers:    
Date of Service: 06-10-25 @ 07:55           CARDIOLOGY     PROGRESS  NOTE   _______________________________________________  CHIEF COMPLAINT:Patient is a 79y old  Male who presents with a chief complaint of CRA occlusion of left eye (09 Jun 2025 08:43)  no complain  	  REVIEW OF SYSTEMS:  CONSTITUTIONAL: No fever, weight loss, or fatigue  EYES: No eye pain, visual disturbances, or discharge  ENT:  No difficulty hearing, tinnitus, vertigo; No sinus or throat pain  NECK: No pain or stiffness  RESPIRATORY: No cough, wheezing, chills or hemoptysis; No Shortness of Breath  CARDIOVASCULAR: No chest pain, palpitations, passing out, dizziness, or leg swelling  GASTROINTESTINAL: No abdominal or epigastric pain. No nausea, vomiting, or hematemesis; No diarrhea or constipation. No melena or hematochezia.  GENITOURINARY: No dysuria, frequency, hematuria, or incontinence  NEUROLOGICAL: No headaches, memory loss, loss of strength, numbness, or tremors  SKIN: No itching, burning, rashes, or lesions   LYMPH Nodes: No enlarged glands  ENDOCRINE: No heat or cold intolerance; No hair loss  MUSCULOSKELETAL: No joint pain or swelling; No muscle, back, or extremity pain  PSYCHIATRIC: No depression, anxiety, mood swings, or difficulty sleeping  HEME/LYMPH: No easy bruising, or bleeding gums  ALLERGY AND IMMUNOLOGIC: No hives or eczema	    [x ] All others negative	  [ ] Unable to obtain    PHYSICAL EXAM:  T(C): 36.4 (06-10-25 @ 05:00), Max: 36.6 (06-09-25 @ 20:30)  HR: 62 (06-10-25 @ 05:00) (60 - 67)  BP: 134/79 (06-10-25 @ 05:00) (116/75 - 138/69)  RR: 18 (06-10-25 @ 05:00) (18 - 18)  SpO2: 94% (06-10-25 @ 05:00) (94% - 95%)  Wt(kg): --  I&O's Summary      Appearance: Normal	  HEENT:   Normal oral mucosa, PERRL, EOMI	  Lymphatic: No lymphadenopathy  Cardiovascular: Normal S1 S2, No JVD, + murmurs, No edema  Respiratory: rhoinchi  Psychiatry: A & O x 3, Mood & affect appropriate  Gastrointestinal:  Soft, Non-tender, + BS	  Skin: No rashes, No ecchymoses, No cyanosis	  Neurologic: Non-focal  Extremities: Normal range of motion, No clubbing, cyanosis or edema  Vascular: Peripheral pulses palpable 2+ bilaterally    MEDICATIONS  (STANDING):  aspirin enteric coated 81 milliGRAM(s) Oral daily  atorvastatin 80 milliGRAM(s) Oral at bedtime  clopidogrel Tablet 75 milliGRAM(s) Oral once  enoxaparin Injectable 40 milliGRAM(s) SubCutaneous every 24 hours  metoprolol succinate ER 50 milliGRAM(s) Oral daily      TELEMETRY: 	    ECG:  	  RADIOLOGY:  OTHER: 	  	  LABS:	 	    CARDIAC MARKERS:                                12.8   5.52  )-----------( 168      ( 10 Mathew 2025 05:41 )             38.1     06-10    140  |  107  |  22  ----------------------------<  84  3.6   |  22  |  0.84    Ca    9.4      10 Mathew 2025 05:41      proBNP:   Lipid Profile: Cholesterol 117  LDL --  HDL 36      HgA1c:   TSH:     < from: TTE W or WO Ultrasound Enhancing Agent (06.09.25 @ 13:56) >   1. Technically difficult image quality.   2. Left ventricular cavity is normal in size. Left ventricular wall thickness is normal. Left ventricular systolic function is normal with an ejection fraction of 64 % by Grace's method of disks. There are no regional wallmotion abnormalities seen.   3. Normal right ventricular cavity size, with normal wall thickness, and normal right ventricular systolic function. Tricuspid annular plane systolic excursion (TAPSE) is 2.6 cm (normal >=1.7 cm).   4. No pericardial effusion seen.   5. Indeterminate saline contrast injection results due to poor image quality. Possible 1-2 bubbles seen vs focal calcification seen subvalvular apparatus on valsalva image (Image 82).   6. Estimated pulmonary artery systolic pressure is 32 mmHg.   7. The inferior vena cava is normal in size (normal <2.1cm) with normal inspiratory collapse (normal >50%) consistent with normal right atrial pressure (~3, range 0-5mmHg).      - f/u optho  - check CD  - Dual antiplatelet therapy with ASA 81mg PO daily and Clopidogrel 75mg PO daily x 3 weeks followed by monotherapy with ASA 81mg PO daily.  - High dose statin therapy - atorvastatin 40mg PO daily. LDL goal <70mg/dL.  - TTE  - will need extended cardiac montioring (ie ILR)  - telemetry  - PT/OT/SS/SLP, OOBC  - normotension   - check FS, glucose control <180    Assessment and plan  ---------------------------  Patient is a 79-year-old male past medical history of diabetes, hypertension, hyperlipidemia, CAD presents for  left eye blurriness  which started on day prior to admission.   Patient reports blurry vision in his left eye  for the past two days. He was evaluated by ophthalmology   and fundoscopy and angiography of the eye which showed signs of CRAO.  He was subsequently advised to come to the hospital. He reports focal motor weakness or numbness , no lightheadedness , no slurred speech.   pt with sig cardiac hx with multiple stents with L eye blindness   CTA noted  asa daily ?role of adding Plavix  echo awaiting  will get cardiac morel from Dr Azevedo  lipitor 80 mg qhs  r/o PAF, tele no a.fib  neuro follow up/ tremors   check ESR / CRP  MR head noted  neurology recommending ILR, will discuss with PT    	        
Neurology Progress Note    S: Patient seen and examined. No new events overnight. patient denied CP, SOB, HA or pain.     Medication:    Medications: MEDICATIONS  (STANDING):  aspirin enteric coated 81 milliGRAM(s) Oral daily  atorvastatin 80 milliGRAM(s) Oral at bedtime  clopidogrel Tablet 75 milliGRAM(s) Oral daily  enoxaparin Injectable 40 milliGRAM(s) SubCutaneous every 24 hours  metoprolol succinate ER 50 milliGRAM(s) Oral daily    MEDICATIONS  (PRN):  acetaminophen     Tablet .. 650 milliGRAM(s) Oral every 6 hours PRN Temp greater or equal to 38C (100.4F), Mild Pain (1 - 3)       Vitals:  Vital Signs Last 24 Hrs  T(C): 36.4 (10 Mathew 2025 05:00), Max: 36.6 (09 Jun 2025 20:30)  T(F): 97.5 (10 Mathew 2025 05:00), Max: 97.8 (09 Jun 2025 20:30)  HR: 62 (10 Mathew 2025 05:00) (62 - 67)  BP: 134/79 (10 Mathew 2025 05:00) (116/75 - 134/79)  BP(mean): --  RR: 18 (10 Mathew 2025 05:00) (18 - 18)  SpO2: 94% (10 Mathew 2025 05:00) (94% - 94%)    Parameters below as of 10 Mathew 2025 05:00  Patient On (Oxygen Delivery Method): room air                General Exam:   General Appearance: Appropriately dressed and in no acute distress       Head: Normocephalic, atraumatic and no dysmorphic features  Ear, Nose, and Throat: Moist mucous membranes  CVS: S1S2+  Resp: No SOB, no wheeze or rhonchi  Abd: soft NTND  Extremities: No edema, no cyanosis  Skin: No bruises, no rashes     Neurological Exam:  Mental status: Awake, alert and oriented x3.  Recent and remote memory intact.  Naming, repetition and comprehension intact.  Attention/concentration intact. Speech fluent and no errors on phoneme assessment.  Fund of knowledge appropriate.    Cranial nerves: pupils equally round and reactive to light, visual fields full, visual impairment consistent with optho exam, no nystagmus, extraocular muscles intact, V1 through V3 intact bilaterally and symmetric, subtle decreased elevation of LEFT oral commissure, hearing intact to voice, palate elevation symmetric, tongue was midline, sternocleidomastoid/shoulder shrug strength bilaterally 5/5.    Motor:  Normal bulk and tone, strength 5/5 in RUE and BLE, 4+/5 and subtle drift wo bed hitting in LUE.   strength 5/5.  Rapid alternating movements intact and symmetric.   Sensation: Intact to light touch thru out and symmetric  Coordination: No dysmetria on finger-to-nose and heel-to-shin.  No clumsiness.  Reflexes: patella BL 3+, L achilles 2+, R achilles 1+, toes down BL, RUE 2+, LUE 3+  Gait: Narrow and steady.    I personally reviewed the below data/images/labs:       LABS:                          12.8   5.52  )-----------( 168      ( 10 Mathew 2025 05:41 )             38.1     06-10    140  |  107  |  22  ----------------------------<  84  3.6   |  22  |  0.84    Ca    9.4      10 Mathew 2025 05:41          Urinalysis Basic - ( 10 Mathew 2025 05:41 )    Color: x / Appearance: x / SG: x / pH: x  Gluc: 84 mg/dL / Ketone: x  / Bili: x / Urobili: x   Blood: x / Protein: x / Nitrite: x   Leuk Esterase: x / RBC: x / WBC x   Sq Epi: x / Non Sq Epi: x / Bacteria: x          06/07/2025  CTH: without acute hemorrhage, hypodensity, or mass effect  CTA head and neck: mod to severe calcified plaque in BLE cavernous ICA (L>R), mod calcified plaque in lacerum portion of L ICA, mild to mod calcified plaque in super cervical portion of ICA prior to entry in foramen lacerum, mild calcified plaque in R ICA just superior to bifurcation, mild stenosis at R CC bifurcation, mod stenosis at L CC bifurcation  mod to severe stenosis in L VA V4 segment   < from: MR Head No Cont (06.08.25 @ 22:01) >    ACC: [id number] EXAM:  MR BRAIN   ORDERED BY: JON LINDA     PROCEDURE DATE:  06/08/2025          INTERPRETATION:  CLINICAL INDICATIONS: stroke    COMPARISON: Head CT dated 6/7/2025.    TECHNIQUE: MRI brain: Multiplanar, multisequence MR imagingof the brain   are obtained without the administration of intravenous Gadavist contrast.    FINDINGS:  6 mm pineal gland cyst.  There is no abnormal restricted diffusion to suggest acute infarction.   Scattered periventricular and subcortical white matter T2 /FLAIR   hyperintensities are seen without mass effect, nonspecific, likely   representing moderate chronic microvascular changes. Normal T2 flow-voids   are seen within  the intracranial vasculature. The lateral ventricles and   cortical sulci are age-appropriate in size and configuration. There is no   mass, mass effect, or extra-axial fluid collection. There is no   susceptibility artifact to suggest hemorrhage. Midline structures are   normal.  The visualized paranasal sinuses, mastoid air cells and orbits   are unremarkable.      IMPRESSION: No acute infarction.    --- End of Report ---            SHEREE CAMP MD; Attending Radiologist  This document has been electronically signed. Jun 8 2025 10:12PM    < end of copied text >  < from: MR Head No Cont (06.08.25 @ 22:01) >    ACC: [id number] EXAM:  MR BRAIN   ORDERED BY: JON LINDA     PROCEDURE DATE:  06/08/2025          INTERPRETATION:  CLINICAL INDICATIONS: stroke    COMPARISON: Head CT dated 6/7/2025.    TECHNIQUE: MRI brain: Multiplanar, multisequence MR imagingof the brain   are obtained without the administration of intravenous Gadavist contrast.    FINDINGS:  6 mm pineal gland cyst.  There is no abnormal restricted diffusion to suggest acute infarction.   Scattered periventricular and subcortical white matter T2 /FLAIR   hyperintensities are seen without mass effect, nonspecific, likely   representing moderate chronic microvascular changes. Normal T2 flow-voids   are seen within  the intracranial vasculature. The lateral ventricles and   cortical sulci are age-appropriate in size and configuration. There is no   mass, mass effect, or extra-axial fluid collection. There is no   susceptibility artifact to suggest hemorrhage. Midline structures are   normal.  The visualized paranasal sinuses, mastoid air cells and orbits   are unremarkable.      IMPRESSION: No acute infarction.    --- End of Report ---            SHEREE CAMP MD; Attending Radiologist  This document has been electronically signed. Jun 8 2025 10:12PM    < end of copied text >  
Neurology Progress Note    S: Patient seen and examined. No new events overnight. patient denied CP, SOB, HA or pain.     Medication:  acetaminophen     Tablet .. 650 milliGRAM(s) Oral every 6 hours PRN  aspirin enteric coated 81 milliGRAM(s) Oral daily  atorvastatin 80 milliGRAM(s) Oral at bedtime  enoxaparin Injectable 40 milliGRAM(s) SubCutaneous every 24 hours  metoprolol succinate ER 50 milliGRAM(s) Oral daily      Vitals:  Vital Signs Last 24 Hrs  T(C): 36.6 (09 Jun 2025 04:58), Max: 36.6 (09 Jun 2025 04:58)  T(F): 97.8 (09 Jun 2025 04:58), Max: 97.8 (09 Jun 2025 04:58)  HR: 61 (09 Jun 2025 04:58) (61 - 70)  BP: 115/61 (09 Jun 2025 04:58) (106/61 - 134/77)  BP(mean): --  RR: 18 (09 Jun 2025 04:58) (18 - 18)  SpO2: 93% (09 Jun 2025 04:58) (93% - 94%)    Parameters below as of 09 Jun 2025 04:58  Patient On (Oxygen Delivery Method): room air        General Exam:   General Appearance: Appropriately dressed and in no acute distress       Head: Normocephalic, atraumatic and no dysmorphic features  Ear, Nose, and Throat: Moist mucous membranes  CVS: S1S2+  Resp: No SOB, no wheeze or rhonchi  Abd: soft NTND  Extremities: No edema, no cyanosis  Skin: No bruises, no rashes     Neurological Exam:  Mental status: Awake, alert and oriented x3.  Recent and remote memory intact.  Naming, repetition and comprehension intact.  Attention/concentration intact. Speech fluent and no errors on phoneme assessment.  Fund of knowledge appropriate.    Cranial nerves: pupils equally round and reactive to light, visual fields full, visual impairment consistent with optho exam, no nystagmus, extraocular muscles intact, V1 through V3 intact bilaterally and symmetric, subtle decreased elevation of LEFT oral commissure, hearing intact to voice, palate elevation symmetric, tongue was midline, sternocleidomastoid/shoulder shrug strength bilaterally 5/5.    Motor:  Normal bulk and tone, strength 5/5 in RUE and BLE, 4+/5 and subtle drift wo bed hitting in LUE.   strength 5/5.  Rapid alternating movements intact and symmetric.   Sensation: Intact to light touch thru out and symmetric  Coordination: No dysmetria on finger-to-nose and heel-to-shin.  No clumsiness.  Reflexes: patella BL 3+, L achilles 2+, R achilles 1+, toes down BL, RUE 2+, LUE 3+  Gait: Narrow and steady.    I personally reviewed the below data/images/labs:      CBC Full  -  ( 07 Jun 2025 16:42 )  WBC Count : 7.72 K/uL  RBC Count : 4.50 M/uL  Hemoglobin : 13.8 g/dL  Hematocrit : 42.5 %  Platelet Count - Automated : 182 K/uL  Mean Cell Volume : 94.4 fl  Mean Cell Hemoglobin : 30.7 pg  Mean Cell Hemoglobin Concentration : 32.5 g/dL  Auto Neutrophil # : x  Auto Lymphocyte # : x  Auto Monocyte # : x  Auto Eosinophil # : x  Auto Basophil # : x  Auto Neutrophil % : x  Auto Lymphocyte % : x  Auto Monocyte % : x  Auto Eosinophil % : x  Auto Basophil % : x    06-08    140  |  105  |  18  ----------------------------<  77  3.6   |  21[L]  |  0.78    Ca    9.7      08 Jun 2025 07:03    TPro  7.3  /  Alb  4.4  /  TBili  0.6  /  DBili  x   /  AST  21  /  ALT  16  /  AlkPhos  59  06-07    LIVER FUNCTIONS - ( 07 Jun 2025 16:42 )  Alb: 4.4 g/dL / Pro: 7.3 g/dL / ALK PHOS: 59 U/L / ALT: 16 U/L / AST: 21 U/L / GGT: x           PT/INR - ( 07 Jun 2025 16:42 )   PT: 12.1 sec;   INR: 1.05 ratio         PTT - ( 07 Jun 2025 16:42 )  PTT:30.4 sec  Urinalysis Basic - ( 08 Jun 2025 07:03 )    Color: x / Appearance: x / SG: x / pH: x  Gluc: 77 mg/dL / Ketone: x  / Bili: x / Urobili: x   Blood: x / Protein: x / Nitrite: x   Leuk Esterase: x / RBC: x / WBC x   Sq Epi: x / Non Sq Epi: x / Bacteria: x      06/07/2025  CTH: without acute hemorrhage, hypodensity, or mass effect  CTA head and neck: mod to severe calcified plaque in BLE cavernous ICA (L>R), mod calcified plaque in lacerum portion of L ICA, mild to mod calcified plaque in super cervical portion of ICA prior to entry in foramen lacerum, mild calcified plaque in R ICA just superior to bifurcation, mild stenosis at R CC bifurcation, mod stenosis at L CC bifurcation  mod to severe stenosis in L VA V4 segment   < from: MR Head No Cont (06.08.25 @ 22:01) >    ACC: [id number] EXAM:  MR BRAIN   ORDERED BY: GEOCOMtmsRELL     PROCEDURE DATE:  06/08/2025          INTERPRETATION:  CLINICAL INDICATIONS: stroke    COMPARISON: Head CT dated 6/7/2025.    TECHNIQUE: MRI brain: Multiplanar, multisequence MR imagingof the brain   are obtained without the administration of intravenous Gadavist contrast.    FINDINGS:  6 mm pineal gland cyst.  There is no abnormal restricted diffusion to suggest acute infarction.   Scattered periventricular and subcortical white matter T2 /FLAIR   hyperintensities are seen without mass effect, nonspecific, likely   representing moderate chronic microvascular changes. Normal T2 flow-voids   are seen within  the intracranial vasculature. The lateral ventricles and   cortical sulci are age-appropriate in size and configuration. There is no   mass, mass effect, or extra-axial fluid collection. There is no   susceptibility artifact to suggest hemorrhage. Midline structures are   normal.  The visualized paranasal sinuses, mastoid air cells and orbits   are unremarkable.      IMPRESSION: No acute infarction.    --- End of Report ---            SHEREE CAMP MD; Attending Radiologist  This document has been electronically signed. Jun 8 2025 10:12PM    < end of copied text >  < from: MR Head No Cont (06.08.25 @ 22:01) >    ACC: [id number] EXAM:  MR BRAIN   ORDERED BY: JONLEONID LINDA     PROCEDURE DATE:  06/08/2025          INTERPRETATION:  CLINICAL INDICATIONS: stroke    COMPARISON: Head CT dated 6/7/2025.    TECHNIQUE: MRI brain: Multiplanar, multisequence MR imagingof the brain   are obtained without the administration of intravenous Gadavist contrast.    FINDINGS:  6 mm pineal gland cyst.  There is no abnormal restricted diffusion to suggest acute infarction.   Scattered periventricular and subcortical white matter T2 /FLAIR   hyperintensities are seen without mass effect, nonspecific, likely   representing moderate chronic microvascular changes. Normal T2 flow-voids   are seen within  the intracranial vasculature. The lateral ventricles and   cortical sulci are age-appropriate in size and configuration. There is no   mass, mass effect, or extra-axial fluid collection. There is no   susceptibility artifact to suggest hemorrhage. Midline structures are   normal.  The visualized paranasal sinuses, mastoid air cells and orbits   are unremarkable.      IMPRESSION: No acute infarction.    --- End of Report ---            SHEREE CAMP MD; Attending Radiologist  This document has been electronically signed. Jun 8 2025 10:12PM    < end of copied text >

## 2025-06-10 NOTE — DISCHARGE NOTE PROVIDER - HOSPITAL COURSE
79-yr  m     presenting to  er,  06/07/2025 for left eye blurriness  for  a  day   / seen by  optho  by Dr. Ava Stubbs who recommended patient be sent in for BRVO rule out.    Ophtho evaluation consistent w CRAO    seen by  neuro  had  LUE drift and L face droop,  not  obvious  now.  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  ILR as  an out  tp  by dawson fletcher/  wife  will  decide  with  dawson fletcher   hence.  d/c  plans   pt  is  full code     HPI:  Patient is a 79-year-old male past medical history of diabetes, hypertension, hyperlipidemia, CAD presents for  left eye blurriness  which started on day prior to admission.   Patient reports blurry vision in his left eye  for the past two days. He was evaluated by ophthalmology   and fundoscopy and angiography of the eye which showed signs of CRAO.  He was subsequently advised to come to the hospital. He reports focal motor weakness or numbness , no lightheadedness , no slurred speech.    (08 Jun 2025 02:06)    Hospital Course:  Patient presented to the ER on 06/07/2025 with left eye blurriness for one day.  Ophthalmology (Dr. Ava Stubbs) recommended evaluation to rule out branch retinal vein occlusion (BRVO).  Upon examination, ophthalmology diagnosed a central retinal artery occlusion (CRAO) in the left eye. No acute treatment was deemed necessary due to the absence of macular edema or neovascularization. Dry age-related macular degeneration was noted in both eyes, without choroidal neovascularization.  The patient also reported benign tremors in the left arm and exhibited left upper extremity (LUE) drift and left-sided facial droop, which resolved by the time of examination.  Neurology evaluation ruled out an acute stroke, supported by a normal CT head and MRI brain.  A workup including an echocardiogram and consideration of an implantable loop recorder (ILR) was initiated.  The patient's hypertension was managed by lowering the Toprol dose.  The echocardiogram showed normal ejection fraction. Cardiology recommended outpatient ILR placement with Dr. Azevedo; the patient's wife will discuss this further with Dr. Azevedo.  The patient was discharged on aspirin and a statin and is full code.      Important Medication Changes and Reason:    Active or Pending Issues Requiring Follow-up:    Advanced Directives:   [ x] Full code  [ ] DNR  [ ] Hospice    Discharge Diagnoses:  Left eye Central retinal artery occlusion       HPI:  Patient is a 79-year-old male past medical history of diabetes, hypertension, hyperlipidemia, CAD presents for  left eye blurriness  which started on day prior to admission.   Patient reports blurry vision in his left eye  for the past two days. He was evaluated by ophthalmology   and fundoscopy and angiography of the eye which showed signs of CRAO.  He was subsequently advised to come to the hospital. He reports focal motor weakness or numbness , no lightheadedness , no slurred speech.    (08 Jun 2025 02:06)    Hospital Course:  Patient presented to the ER on 06/07/2025 with left eye blurriness for one day.  Ophthalmology (Dr. Ava Stubbs) recommended evaluation to rule out branch retinal vein occlusion (BRVO).  Upon examination, ophthalmology diagnosed a central retinal artery occlusion (CRAO) in the left eye. No acute treatment was deemed necessary due to the absence of macular edema or neovascularization. Dry age-related macular degeneration was noted in both eyes, without choroidal neovascularization.  The patient also reported benign tremors in the left arm and exhibited left upper extremity (LUE) drift and left-sided facial droop, which resolved by the time of examination.  Neurology evaluation ruled out an acute stroke, supported by a normal CT head and MRI brain.  A workup including an echocardiogram and consideration of an implantable loop recorder (ILR) was initiated.  The patient's hypertension was managed by lowering the Toprol dose.  The echocardiogram showed normal ejection fraction. Cardiology recommended outpatient ILR placement with Dr. Azevedo; the patient's wife will discuss this further with Dr. Azevedo. As per discussion with pt and pt's spouse, pt will follow up with Dr Azevedo for ILR implantation. The patient was discharged on aspirin, Plavix and a statin and is full code.      Important Medication Changes and Reason:    Active or Pending Issues Requiring Follow-up:    Advanced Directives:   [ x] Full code  [ ] DNR  [ ] Hospice    Discharge Diagnoses:  Left eye Central retinal artery occlusion  Ruled out for acute stroke,        HPI:  Patient is a 79-year-old male past medical history of diabetes, hypertension, hyperlipidemia, CAD presents for  left eye blurriness  which started on day prior to admission.   Patient reports blurry vision in his left eye  for the past two days. He was evaluated by ophthalmology   and fundoscopy and angiography of the eye which showed signs of CRAO.  He was subsequently advised to come to the hospital. He reports focal motor weakness or numbness , no lightheadedness , no slurred speech.    (08 Jun 2025 02:06)    Hospital Course:  Patient presented to the ER on 06/07/2025 with left eye blurriness for one day.  Ophthalmology (Dr. Ava Stubbs) recommended evaluation to rule out branch retinal vein occlusion (BRVO).  Upon examination, ophthalmology diagnosed a central retinal artery occlusion (CRAO) in the left eye. No acute treatment was deemed necessary due to the absence of macular edema or neovascularization. Dry age-related macular degeneration was noted in both eyes, without choroidal neovascularization.  The patient also reported benign tremors in the left arm and exhibited left upper extremity (LUE) drift and left-sided facial droop, which resolved by the time of examination.  Neurology evaluation ruled out an acute stroke, supported by a normal CT head and MRI brain.  A workup including an echocardiogram and consideration of an implantable loop recorder (ILR) was initiated.  The patient's hypertension was managed by lowering the Toprol dose.  The echocardiogram showed normal ejection fraction. Cardiology recommended outpatient ILR placement with Dr. Azevedo; the patient's wife will discuss this further with Dr. Azevedo. As per discussion with pt and pt's spouse, pt will follow up with Dr Azevedo for ILR implantation. The patient was discharged on aspirin, Plavix and a statin and is full code.      Important Medication Changes and Reason:    Active or Pending Issues Requiring Follow-up:    Advanced Directives:   [ x] Full code  [ ] DNR  [ ] Hospice    Discharge Diagnoses:  Left eye Central retinal artery occlusion  Ruled out for acute stroke,   Moderate ICA's occlusion

## 2025-06-10 NOTE — DISCHARGE NOTE NURSING/CASE MANAGEMENT/SOCIAL WORK - PATIENT PORTAL LINK FT
You can access the FollowMyHealth Patient Portal offered by [facility] by registering at the following website: [email or web address]. By joining [facility]’s FollowMyHealth portal, you will also be able to view your health information using other applications (apps) compatible with our system.

## 2025-06-10 NOTE — DISCHARGE NOTE PROVIDER - PROVIDER TOKENS
PROVIDER:[TOKEN:[[id number]]] PROVIDER:[TOKEN:[[id number]]],PROVIDER:[TOKEN:[[id number]],FOLLOWUP:[1 week]] PROVIDER:[TOKEN:[[id number]],SCHEDULEDAPPT:[07/24/2025]]

## 2025-06-10 NOTE — DISCHARGE NOTE NURSING/CASE MANAGEMENT/SOCIAL WORK - FINANCIAL ASSISTANCE
[facility] provides services at a reduced cost to those who are determined to be eligible through [facility]’s financial assistance program. Information regarding [facility]’s financial assistance program can be found by going to [email or web address] or by calling [phone].

## 2025-06-10 NOTE — PROGRESS NOTE ADULT - ASSESSMENT
79-yr  m     presenting to  er,  06/07/2025 for left eye blurriness  for  a  day   / seen by  optho  by Dr. Ava Stubbs who recommended patient be sent in for BRVO rule out.    Ophtho evaluation consistent w CRAO    seen by  neuro  had  LUE drift and L face droop,  not  obvious  now.  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 Technically difficult image quality.   2. Left ventricular cavity is normal in size. Left ventricular wall thickness is normal. Left ventricular systolic function is normal with an ejection fraction of 64 % by Grace's method of disks. There are no regional wallmotion abnormalities seen.   3. Normal right ventricular cavity size, with normal wall thickness, and normal right ventricular systolic function. Tricuspid annular plane systolic excursion (TAPSE) is 2.6 cm (normal >=1.7 cm).   4. No pericardial effusion seen.   5. Indeterminate saline contrast injection results due to poor image quality. Possible 1-2 bubbles seen vs focal calcification seen subvalvular apparatus on valsalva image (Image 82).   6. Estimated pulmonary artery systolic pressure is 32 mmHg.   7. The inferior vena cava is normal in size (normal <2.1cm) with normal inspiratory collapse (normal >50%) consistent with normal right atrial pressure (~3, range 0-5mmHg).  < end of copied text >         79-yr  m     presenting to  er,  06/07/2025 for left eye blurriness  for  a  day   / seen by  optho  by Dr. Ava Stubbs who recommended patient be sent in for BRVO rule out.    Ophtho evaluation consistent w CRAO    seen by  neuro  had  LUE drift and L face droop,  not  obvious  now.  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  ILR a  s an out  tp  by dawson fletcher/  wife  will  decide  with  dawson fletcher   hence.  d/c  plans   pt  is  full code       rad< from: TTE W or WO Ultrasound Enhancing Agent (06.09.25 @ 13:56) >  CONCLUSIONS:   1. Technically difficult image quality.   2. Left ventricular cavity is normal in size. Left ventricular wall thickness is normal. Left ventricular systolic function is normal with an ejection fraction of 64 % by Grace's method of disks. There are no regional wallmotion abnormalities seen.   3. Normal right ventricular cavity size, with normal wall thickness, and normal right ventricular systolic function. Tricuspid annular plane systolic excursion (TAPSE) is 2.6 cm (normal >=1.7 cm).   4. No pericardial effusion seen.   5. Indeterminate saline contrast injection results due to poor image quality. Possible 1-2 bubbles seen vs focal calcification seen subvalvular apparatus on valsalva image (Image 82).   6. Estimated pulmonary artery systolic pressure is 32 mmHg.   7. The inferior vena cava is normal in size (normal <2.1cm) with normal inspiratory collapse (normal >50%) consistent with normal right atrial pressure (~3, range 0-5mmHg).  < end of copied text >

## 2025-06-10 NOTE — PROGRESS NOTE ADULT - REASON FOR ADMISSION
CRA occlusion of left eye
visual deficits

## 2025-06-10 NOTE — DISCHARGE NOTE PROVIDER - NSDCFUADDAPPT_GEN_ALL_CORE_FT
Follow up with PMD and neurology within 3- 5 days  of discharge. Follow up with PMD and neurology within 3- 5 days  of discharge.  __ - Pt to follow up with Dr. Unruly Cheung (retina specialist, [facility] [[email or web address] at scheduled appointment on 7/24/25 at 8:00am    APPTS ARE READY TO BE MADE: [x ] YES    Best Family or Patient Contact (if needed):    Additional Information about above appointments (if needed):    1:   2:   3:     Other comments or requests:    Follow up with PMD and neurology within 3- 5 days  of discharge.  __ - Pt to follow up with Dr. Unruly Cheung (retina specialist, [facility]) at scheduled appointment on 7/24/25 at 8:00am    APPTS ARE READY TO BE MADE: [x ] YES    Best Family or Patient Contact (if needed):    Additional Information about above appointments (if needed):    1:   2:   3:     Other comments or requests:     Patients wife informed us they already have secured a follow up appointment which is not visible on [facility] and declined to provide appointment details.

## 2025-06-10 NOTE — DISCHARGE NOTE PROVIDER - NSDCMRMEDTOKEN_GEN_ALL_CORE_FT
aspirin 81 mg oral delayed release tablet: 1 tab(s) orally every other day  Breztri Aerosphere 160 mcg-9 mcg-4.8 mcg/inh inhalation aerosol: 2 puff(s) inhaled 2 times a day  COQ10 Tablet: 1 tablet orally once a day  losartan-hydrochlorothiazide 100 mg-25 mg oral tablet: 1 tab(s) orally once a day  metoprolol succinate 100 mg oral tablet, extended release: 1 tab(s) orally once a day (at bedtime) **Take with Metoprolol Succ 50mg for Total Dose: 150mg**  metoprolol succinate 50 mg oral tablet, extended release: 1 tab(s) orally once a day **Take with Metoprolol Succ 100mg for Total Dose: 150mg**  omeprazole 40 mg oral delayed release capsule: 1 cap(s) orally once a day (in the morning)  rosuvastatin 10 mg oral tablet: 1 tab(s) orally once a day  Vitamin B12 Tablet: 1 tablet orally once a day   aspirin 81 mg oral delayed release tablet: 1 tab(s) orally every other day  Breztri Aerosphere 160 mcg-9 mcg-4.8 mcg/inh inhalation aerosol: 2 puff(s) inhaled 2 times a day  clopidogrel 75 mg oral tablet: 1 tab(s) orally once a day  COQ10 Tablet: 1 tablet orally once a day  Lipitor 40 mg oral tablet: 1 tab(s) orally once a day  metoprolol succinate 50 mg oral tablet, extended release: 1 tab(s) orally once a day  omeprazole 40 mg oral delayed release capsule: 1 cap(s) orally once a day (in the morning)  Vitamin B12 Tablet: 1 tablet orally once a day

## 2025-06-11 VITALS
SYSTOLIC BLOOD PRESSURE: 143 MMHG | HEART RATE: 61 BPM | RESPIRATION RATE: 18 BRPM | TEMPERATURE: 98 F | DIASTOLIC BLOOD PRESSURE: 84 MMHG | OXYGEN SATURATION: 96 %

## 2025-07-02 ENCOUNTER — APPOINTMENT (OUTPATIENT)
Dept: OPHTHALMOLOGY | Facility: CLINIC | Age: 80
End: 2025-07-02